# Patient Record
Sex: FEMALE | Race: WHITE | NOT HISPANIC OR LATINO | Employment: OTHER | URBAN - METROPOLITAN AREA
[De-identification: names, ages, dates, MRNs, and addresses within clinical notes are randomized per-mention and may not be internally consistent; named-entity substitution may affect disease eponyms.]

---

## 2022-01-07 ENCOUNTER — LAB REQUISITION (OUTPATIENT)
Dept: LAB | Facility: CLINIC | Age: 87
End: 2022-01-07
Payer: MEDICARE

## 2022-01-07 DIAGNOSIS — U07.1 COVID-19: ICD-10-CM

## 2022-01-08 ENCOUNTER — LAB REQUISITION (OUTPATIENT)
Dept: LAB | Facility: CLINIC | Age: 87
End: 2022-01-08
Payer: COMMERCIAL

## 2022-01-08 DIAGNOSIS — U07.1 COVID-19: ICD-10-CM

## 2022-01-08 PROCEDURE — U0003 INFECTIOUS AGENT DETECTION BY NUCLEIC ACID (DNA OR RNA); SEVERE ACUTE RESPIRATORY SYNDROME CORONAVIRUS 2 (SARS-COV-2) (CORONAVIRUS DISEASE [COVID-19]), AMPLIFIED PROBE TECHNIQUE, MAKING USE OF HIGH THROUGHPUT TECHNOLOGIES AS DESCRIBED BY CMS-2020-01-R: HCPCS | Mod: ORL | Performed by: INTERNAL MEDICINE

## 2022-01-09 LAB — SARS-COV-2 RNA RESP QL NAA+PROBE: NEGATIVE

## 2022-05-04 ENCOUNTER — LAB REQUISITION (OUTPATIENT)
Dept: LAB | Facility: CLINIC | Age: 87
End: 2022-05-04
Payer: COMMERCIAL

## 2022-05-04 DIAGNOSIS — U07.1 COVID-19: ICD-10-CM

## 2022-05-04 PROCEDURE — U0003 INFECTIOUS AGENT DETECTION BY NUCLEIC ACID (DNA OR RNA); SEVERE ACUTE RESPIRATORY SYNDROME CORONAVIRUS 2 (SARS-COV-2) (CORONAVIRUS DISEASE [COVID-19]), AMPLIFIED PROBE TECHNIQUE, MAKING USE OF HIGH THROUGHPUT TECHNOLOGIES AS DESCRIBED BY CMS-2020-01-R: HCPCS | Mod: ORL | Performed by: INTERNAL MEDICINE

## 2022-05-05 LAB — SARS-COV-2 RNA RESP QL NAA+PROBE: NEGATIVE

## 2022-09-04 ENCOUNTER — TRANSFERRED RECORDS (OUTPATIENT)
Dept: HEALTH INFORMATION MANAGEMENT | Facility: CLINIC | Age: 87
End: 2022-09-04

## 2022-09-05 ENCOUNTER — TRANSFERRED RECORDS (OUTPATIENT)
Dept: HEALTH INFORMATION MANAGEMENT | Facility: CLINIC | Age: 87
End: 2022-09-05

## 2022-09-06 ENCOUNTER — TRANSFERRED RECORDS (OUTPATIENT)
Dept: HEALTH INFORMATION MANAGEMENT | Facility: CLINIC | Age: 87
End: 2022-09-06

## 2022-12-06 ENCOUNTER — TRANSFERRED RECORDS (OUTPATIENT)
Dept: HEALTH INFORMATION MANAGEMENT | Facility: CLINIC | Age: 87
End: 2022-12-06

## 2022-12-08 ENCOUNTER — MEDICAL CORRESPONDENCE (OUTPATIENT)
Dept: HEALTH INFORMATION MANAGEMENT | Facility: CLINIC | Age: 87
End: 2022-12-08

## 2022-12-09 ENCOUNTER — MEDICAL CORRESPONDENCE (OUTPATIENT)
Dept: HEALTH INFORMATION MANAGEMENT | Facility: CLINIC | Age: 87
End: 2022-12-09

## 2022-12-12 ENCOUNTER — TELEPHONE (OUTPATIENT)
Dept: GASTROENTEROLOGY | Facility: CLINIC | Age: 87
End: 2022-12-12

## 2022-12-12 ENCOUNTER — TRANSCRIBE ORDERS (OUTPATIENT)
Dept: OTHER | Age: 87
End: 2022-12-12

## 2022-12-12 DIAGNOSIS — K80.50 CHOLEDOCHOLITHIASIS: Primary | ICD-10-CM

## 2022-12-12 NOTE — TELEPHONE ENCOUNTER
Advanced Endoscopy     Referring provider:   Referred by: Bal Harvey MD - [facility]   [address] [facility] [address]  [place] [address]  Ph: [phone] Fx: [phone]      Referred to: Advanced Endoscopy Provider [facility]     Provider Requested:  NA     Referral Received: 12/12/22       Records received: CareEverywhere     Images received: none    Evaluation for: Non-Urgent ERCP in 2-3 months for stone removal. Large impacted stone, needs lithotripsy       Clinical History (per RN review):   Admission at [facility] in September 2022  Choledocholithiasis/ Cholelithiasis w/ dilated CBD  - s/p ERCP 9/6 with biliary stents placed   - initially on zosyn --> changed to augmentin to complete 7 day course. GI will arrange f/u for repeat ERCP & EGD    ERCP 9-6-22  Impression:            - The major papilla appeared to be                          flat.                          - Choledocholithiasis was found.                          Removal by biliary sphincterotomy                          was not accomplished; a stent was                          inserted.                          - A biliary sphincterotomy was                          performed.                          - The biliary tree was swept and                          debris and sludge were found.                          - Two stents were placed into the                          common bile duct.       ERCP 12/6/22  Impression:            - The major papilla was adjacent to                          a diverticulum.                          - Two stents from the biliary tree                          were seen in the major papilla.                          - Choledocholithiasis was found.                          Partial removal was accomplished                          with balloon extraction; a stent                          was inserted.                          - Two stents were removed from the                          biliary  tree.                          - The biliary tree was swept.                          - One biliary stent was placed into                          the common bile duct.     Imaging CT Abdomen 9/4/22    IMPRESSION:   1. The common bile duct appears moderately dilated and is filled with heterogeneous intermediate attenuation material that could represent calculi. Comparison with prior imaging studies would be useful, if available. If not available, a right upper quadrant ultrasound would be useful for further evaluation.   2. A focus of gas in the urinary bladder lumen could relate to recent instrumentation or cystitis. Recommend clinical correlation.   3. No other acute abnormality identified in the abdomen or pelvis.   4. Cholelithiasis.      MD review date: 12/12/22    MD Decision for clinic consultation/Orders:            Referral updates/Patient contacted:

## 2022-12-14 ENCOUNTER — PREP FOR PROCEDURE (OUTPATIENT)
Dept: GASTROENTEROLOGY | Facility: CLINIC | Age: 87
End: 2022-12-14

## 2022-12-14 DIAGNOSIS — K80.50 CHOLEDOCHOLITHIASIS: Primary | ICD-10-CM

## 2022-12-14 NOTE — TELEPHONE ENCOUNTER
Per Dr. Gale  Schedule ercp w me whenever feasible in    Needs preop, needs to hold plavix for 5 days    Please assist in scheduling:     Procedure/Imaging/Clinic: ERCP w/EHL  Physician: Dr. Gale  Timin22  Procedure length:provider average  Anesthesia:gen  Dx: choledocholathiasis  Tier:2  Location: [facility]       Called to discuss with patient.     Explained they can expect a call from  for date and time of procedure, will need a , someone to stay with them for 24 hours and should stay in town for 24 hours (within 45 min of Hospital) post procedure    Patient needs to get pre-op physical completed. If outside [facility] system will need physical faxed to number [phone]   If you do not get a preop physical, your procedure could be cancelled, patient voiced understanding*    Preop Plan:PCP will do     Med Review    Blood thinner -  Plavix, will hold for 5 days  ASA - no  Diabetic - no    COVID test discussed:policy reviewed    Patient Education r/t procedure:done    Does patient have any history of gastric bypass/gastric surgery/altered panc/bili anatomy?no    A pre-op nurse will call 1-2 days prior to the procedure.    NPO/Prep:   Adults and Children of all ages may consume solids up to 8 hours prior to arrival time - may consume clear liquids up to 1 hour prior to arrival time.    Other specific details/comments:none     Verbalized understanding of all instructions. All questions answered.     Procedure order placed, message routed to OR / Endo

## 2023-01-03 ENCOUNTER — TELEPHONE (OUTPATIENT)
Dept: GASTROENTEROLOGY | Facility: CLINIC | Age: 88
End: 2023-01-03

## 2023-01-03 NOTE — TELEPHONE ENCOUNTER
Patient reached out inquiring more information regarding up coming procedure on 1/17 at [facility]. Inquired about check in time, location, pre-op physical requirement. Provided information base on Suzette's telephone encounter on 12/12 and informed patient that staff will generally reach out closer to date to go over details and instructions. Patient expressed understanding.

## 2023-01-10 ENCOUNTER — TRANSFERRED RECORDS (OUTPATIENT)
Dept: MULTI SPECIALTY CLINIC | Facility: CLINIC | Age: 88
End: 2023-01-10

## 2023-01-10 LAB
CREATININE (EXTERNAL): 0.8 MG/DL (ref 0.5–1.2)
GFR ESTIMATED (EXTERNAL): 67 ML/MIN/1.73
GFR ESTIMATED (IF AFRICAN AMERICAN) (EXTERNAL): 78 ML/MIN/1.73
GLUCOSE (EXTERNAL): 189 MG/DL (ref 65–99)
POTASSIUM (EXTERNAL): 4.2 MMOL/L (ref 3.5–5)

## 2023-01-13 RX ORDER — METOPROLOL SUCCINATE 25 MG/1
25 TABLET, EXTENDED RELEASE ORAL
COMMUNITY
Start: 2022-09-11 | End: 2023-09-11

## 2023-01-13 RX ORDER — ATORVASTATIN CALCIUM 40 MG/1
40 TABLET, FILM COATED ORAL DAILY
COMMUNITY
Start: 2022-03-30

## 2023-01-13 RX ORDER — FEXOFENADINE HCL 180 MG/1
180 TABLET ORAL DAILY
COMMUNITY
Start: 2022-02-16

## 2023-01-13 RX ORDER — NITROGLYCERIN 0.4 MG/1
0.4 TABLET SUBLINGUAL
COMMUNITY

## 2023-01-13 RX ORDER — LEVOCETIRIZINE DIHYDROCHLORIDE 5 MG/1
5 TABLET, FILM COATED ORAL
COMMUNITY

## 2023-01-13 RX ORDER — CLOPIDOGREL BISULFATE 75 MG/1
75 TABLET ORAL DAILY
COMMUNITY
Start: 2022-03-31

## 2023-01-13 RX ORDER — INSULIN LISPRO 100 [IU]/ML
7 INJECTION, SOLUTION INTRAVENOUS; SUBCUTANEOUS
COMMUNITY

## 2023-01-13 RX ORDER — INSULIN GLARGINE 100 [IU]/ML
20 INJECTION, SOLUTION SUBCUTANEOUS AT BEDTIME
COMMUNITY

## 2023-01-13 RX ORDER — LEVOTHYROXINE SODIUM 88 UG/1
88 TABLET ORAL DAILY
COMMUNITY

## 2023-01-13 RX ORDER — HYDROCHLOROTHIAZIDE 25 MG/1
12.5 TABLET ORAL DAILY
COMMUNITY
Start: 2022-03-02

## 2023-01-13 RX ORDER — ISOSORBIDE MONONITRATE 30 MG/1
60 TABLET, EXTENDED RELEASE ORAL DAILY
COMMUNITY
Start: 2022-08-04

## 2023-01-16 ENCOUNTER — ANESTHESIA EVENT (OUTPATIENT)
Dept: SURGERY | Facility: CLINIC | Age: 88
End: 2023-01-16
Payer: COMMERCIAL

## 2023-01-16 ASSESSMENT — LIFESTYLE VARIABLES: TOBACCO_USE: 0

## 2023-01-16 NOTE — ANESTHESIA PREPROCEDURE EVALUATION
Anesthesia Pre-Procedure Evaluation    Patient: Marcela Kim   MRN: [id number] : 1935        Procedure : Procedure(s):  ENDOSCOPIC RETROGRADE CHOLANGIOPANCREATOGRAPHY, WITH ELECTROHYDRAULIC LITHOTRIPSY USING OpenSpace DIRECT VISUALIZATION SYSTEM          Past Medical History:   Diagnosis Date     Antiplatelet or antithrombotic long-term use      ASCVD (arteriosclerotic cardiovascular disease)      Hypertension      Stented coronary artery      Thyroid disease       Past Surgical History:   Procedure Laterality Date     AS TOTAL ABDOM HYSTERECTOMY       BACK SURGERY       COLECTOMY PARTIAL       EYE SURGERY       Stent      ASCVD Stent of LAD and PTCA       Allergies   Allergen Reactions     Lisinopril Hives and Rash     Rash       Hydralazine Dizziness     Head fullness, feeling unwell.  Avoid dose of 25mg twice daily.        Amlodipine      Other reaction(s): edema     Gabapentin      Other reaction(s): dizziness     Omeprazole Hives     Pt taking. States not allergic.        Social History     Tobacco Use     Smoking status: Never     Smokeless tobacco: Never   Substance Use Topics     Alcohol use: Not Currently     Comment: rare      Wt Readings from Last 1 Encounters:   No data found for Wt        Anesthesia Evaluation   Pt has had prior anesthetic. Type: General.        ROS/MED HX  ENT/Pulmonary:    (-) tobacco use   Neurologic:       Cardiovascular:     (+) Dyslipidemia hypertension--CAD (s/p stent to LAD) ---    METS/Exercise Tolerance:     Hematologic:       Musculoskeletal:       GI/Hepatic: Comment: Choledocholithiasis s/p ERCP and stent at OSH    (+) GERD (s/p prior EGD for gastric ulcer),     Renal/Genitourinary:       Endo:     (+) type II DM, Using insulin, thyroid problem, hypothyroidism,     Psychiatric/Substance Use:       Infectious Disease:       Malignancy:       Other:            Physical Exam    Airway        Mallampati: II   TM distance: > 3 FB   Neck ROM: full   Mouth  opening: > 3 cm    Respiratory Devices and Support         Dental         B=Bridge, C=Chipped, L=Loose, M=Missing    Cardiovascular   cardiovascular exam normal          Pulmonary                   OUTSIDE LABS:  CBC: No results found for: WBC, HGB, HCT, PLT  BMP: No results found for: NA, POTASSIUM, CHLORIDE, CO2, BUN, CR, GLC  COAGS: No results found for: PTT, INR, FIBR  POC: No results found for: BGM, HCG, HCGS  HEPATIC: No results found for: ALBUMIN, PROTTOTAL, ALT, AST, GGT, ALKPHOS, BILITOTAL, BILIDIRECT, SONDRA  OTHER: No results found for: PH, LACT, A1C, LIZETT, PHOS, MAG, LIPASE, AMYLASE, TSH, T4, T3, CRP, SED    Anesthesia Plan    ASA Status:  3   NPO Status:  NPO Appropriate    Anesthesia Type: General.     - Airway: ETT   Induction: Intravenous.   Maintenance: Inhalation.        Consents    Anesthesia Plan(s) and associated risks, benefits, and realistic alternatives discussed. Questions answered and patient/representative(s) expressed understanding.     - Discussed: Risks, Benefits and Alternatives for BOTH SEDATION and the PROCEDURE were discussed     - Discussed with:  Patient         Postoperative Care    Pain management: IV analgesics, Multi-modal analgesia.   PONV prophylaxis: Ondansetron (or other 5HT-3), Dexamethasone or Solumedrol     Comments:                Andi Escamilla MD

## 2023-01-17 ENCOUNTER — ANESTHESIA (OUTPATIENT)
Dept: SURGERY | Facility: CLINIC | Age: 88
End: 2023-01-17
Payer: COMMERCIAL

## 2023-01-17 ENCOUNTER — APPOINTMENT (OUTPATIENT)
Dept: GENERAL RADIOLOGY | Facility: CLINIC | Age: 88
End: 2023-01-17
Attending: INTERNAL MEDICINE
Payer: COMMERCIAL

## 2023-01-17 ENCOUNTER — HOSPITAL ENCOUNTER (OUTPATIENT)
Facility: CLINIC | Age: 88
Discharge: HOME | End: 2023-01-17
Attending: INTERNAL MEDICINE | Admitting: INTERNAL MEDICINE
Payer: COMMERCIAL

## 2023-01-17 VITALS
TEMPERATURE: 97.5 F | HEIGHT: 61 IN | OXYGEN SATURATION: 95 % | SYSTOLIC BLOOD PRESSURE: 140 MMHG | WEIGHT: 128.75 LBS | BODY MASS INDEX: 24.31 KG/M2 | RESPIRATION RATE: 18 BRPM | HEART RATE: 65 BPM | DIASTOLIC BLOOD PRESSURE: 62 MMHG

## 2023-01-17 LAB
ALBUMIN SERPL BCG-MCNC: 3.6 G/DL (ref 3.5–5.2)
ALP SERPL-CCNC: 131 U/L (ref 35–104)
ALT SERPL W P-5'-P-CCNC: 8 U/L (ref 10–35)
AMYLASE SERPL-CCNC: 41 U/L (ref 28–100)
ANION GAP SERPL CALCULATED.3IONS-SCNC: 13 MMOL/L (ref 7–15)
AST SERPL W P-5'-P-CCNC: 17 U/L (ref 10–35)
BILIRUB SERPL-MCNC: 0.7 MG/DL
BUN SERPL-MCNC: 19.7 MG/DL (ref 8–23)
CALCIUM SERPL-MCNC: 8.8 MG/DL (ref 8.8–10.2)
CHLORIDE SERPL-SCNC: 102 MMOL/L (ref 98–107)
CREAT SERPL-MCNC: 0.86 MG/DL (ref 0.51–0.95)
DEPRECATED HCO3 PLAS-SCNC: 20 MMOL/L (ref 22–29)
ERCP: NORMAL
ERYTHROCYTE [DISTWIDTH] IN BLOOD BY AUTOMATED COUNT: 12.7 % (ref 10–15)
GFR SERPL CREATININE-BSD FRML MDRD: 66 ML/MIN/1.73M2
GLUCOSE BLDC GLUCOMTR-MCNC: 207 MG/DL (ref 70–99)
GLUCOSE BLDC GLUCOMTR-MCNC: 272 MG/DL (ref 70–99)
GLUCOSE BLDC GLUCOMTR-MCNC: 338 MG/DL (ref 70–99)
GLUCOSE BLDC GLUCOMTR-MCNC: 345 MG/DL (ref 70–99)
GLUCOSE BLDC GLUCOMTR-MCNC: 403 MG/DL (ref 70–99)
GLUCOSE SERPL-MCNC: 449 MG/DL (ref 70–99)
HCT VFR BLD AUTO: 37.7 % (ref 35–47)
HGB BLD-MCNC: 12.1 G/DL (ref 11.7–15.7)
INR PPP: 1.13 (ref 0.85–1.15)
LIPASE SERPL-CCNC: 15 U/L (ref 13–60)
MCH RBC QN AUTO: 28.7 PG (ref 26.5–33)
MCHC RBC AUTO-ENTMCNC: 32.1 G/DL (ref 31.5–36.5)
MCV RBC AUTO: 90 FL (ref 78–100)
PLATELET # BLD AUTO: 193 10E3/UL (ref 150–450)
POTASSIUM SERPL-SCNC: 4 MMOL/L (ref 3.4–5.3)
PROT SERPL-MCNC: 6 G/DL (ref 6.4–8.3)
RBC # BLD AUTO: 4.21 10E6/UL (ref 3.8–5.2)
SODIUM SERPL-SCNC: 135 MMOL/L (ref 136–145)
WBC # BLD AUTO: 6.3 10E3/UL (ref 4–11)

## 2023-01-17 PROCEDURE — 80053 COMPREHEN METABOLIC PANEL: CPT | Performed by: INTERNAL MEDICINE

## 2023-01-17 PROCEDURE — 36415 COLL VENOUS BLD VENIPUNCTURE: CPT | Performed by: INTERNAL MEDICINE

## 2023-01-17 PROCEDURE — 250N000013 HC RX MED GY IP 250 OP 250 PS 637: Performed by: STUDENT IN AN ORGANIZED HEALTH CARE EDUCATION/TRAINING PROGRAM

## 2023-01-17 PROCEDURE — 250N000009 HC RX 250: Performed by: ANESTHESIOLOGY

## 2023-01-17 PROCEDURE — C1876 STENT, NON-COA/NON-COV W/DEL: HCPCS | Performed by: INTERNAL MEDICINE

## 2023-01-17 PROCEDURE — 250N000009 HC RX 250: Performed by: STUDENT IN AN ORGANIZED HEALTH CARE EDUCATION/TRAINING PROGRAM

## 2023-01-17 PROCEDURE — 83690 ASSAY OF LIPASE: CPT | Performed by: INTERNAL MEDICINE

## 2023-01-17 PROCEDURE — 360N000082 HC SURGERY LEVEL 2 W/ FLUORO, PER MIN: Performed by: INTERNAL MEDICINE

## 2023-01-17 PROCEDURE — 82150 ASSAY OF AMYLASE: CPT | Performed by: INTERNAL MEDICINE

## 2023-01-17 PROCEDURE — 370N000017 HC ANESTHESIA TECHNICAL FEE, PER MIN: Performed by: INTERNAL MEDICINE

## 2023-01-17 PROCEDURE — 250N000011 HC RX IP 250 OP 636: Performed by: ANESTHESIOLOGY

## 2023-01-17 PROCEDURE — 272N000001 HC OR GENERAL SUPPLY STERILE: Performed by: INTERNAL MEDICINE

## 2023-01-17 PROCEDURE — 85610 PROTHROMBIN TIME: CPT | Performed by: INTERNAL MEDICINE

## 2023-01-17 PROCEDURE — 999N000141 HC STATISTIC PRE-PROCEDURE NURSING ASSESSMENT: Performed by: INTERNAL MEDICINE

## 2023-01-17 PROCEDURE — 88305 TISSUE EXAM BY PATHOLOGIST: CPT | Mod: TC | Performed by: INTERNAL MEDICINE

## 2023-01-17 PROCEDURE — 258N000003 HC RX IP 258 OP 636: Performed by: STUDENT IN AN ORGANIZED HEALTH CARE EDUCATION/TRAINING PROGRAM

## 2023-01-17 PROCEDURE — C1769 GUIDE WIRE: HCPCS | Performed by: INTERNAL MEDICINE

## 2023-01-17 PROCEDURE — 710N000012 HC RECOVERY PHASE 2, PER MINUTE: Performed by: INTERNAL MEDICINE

## 2023-01-17 PROCEDURE — 82962 GLUCOSE BLOOD TEST: CPT | Mod: 91

## 2023-01-17 PROCEDURE — 250N000025 HC SEVOFLURANE, PER MIN: Performed by: INTERNAL MEDICINE

## 2023-01-17 PROCEDURE — 710N000010 HC RECOVERY PHASE 1, LEVEL 2, PER MIN: Performed by: INTERNAL MEDICINE

## 2023-01-17 PROCEDURE — 255N000002 HC RX 255 OP 636: Performed by: INTERNAL MEDICINE

## 2023-01-17 PROCEDURE — 999N000181 XR SURGERY CARM FLUORO GREATER THAN 5 MIN W STILLS: Mod: TC

## 2023-01-17 PROCEDURE — 250N000009 HC RX 250: Performed by: INTERNAL MEDICINE

## 2023-01-17 PROCEDURE — 85027 COMPLETE CBC AUTOMATED: CPT | Performed by: INTERNAL MEDICINE

## 2023-01-17 PROCEDURE — 250N000011 HC RX IP 250 OP 636: Performed by: STUDENT IN AN ORGANIZED HEALTH CARE EDUCATION/TRAINING PROGRAM

## 2023-01-17 DEVICE — STENT SOLUS BILIARY 10FRX07CM DBL PIGTAIL W/INTRO G25673
Type: IMPLANTABLE DEVICE | Site: BILE DUCT | Status: NON-FUNCTIONAL
Removed: 2023-02-14

## 2023-01-17 RX ORDER — ONDANSETRON 2 MG/ML
4 INJECTION INTRAMUSCULAR; INTRAVENOUS EVERY 30 MIN PRN
Status: DISCONTINUED | OUTPATIENT
Start: 2023-01-17 | End: 2023-01-17 | Stop reason: HOSPADM

## 2023-01-17 RX ORDER — LEVOFLOXACIN 5 MG/ML
INJECTION, SOLUTION INTRAVENOUS PRN
Status: DISCONTINUED | OUTPATIENT
Start: 2023-01-17 | End: 2023-01-17

## 2023-01-17 RX ORDER — ONDANSETRON 2 MG/ML
4 INJECTION INTRAMUSCULAR; INTRAVENOUS EVERY 6 HOURS PRN
Status: DISCONTINUED | OUTPATIENT
Start: 2023-01-17 | End: 2023-01-17 | Stop reason: HOSPADM

## 2023-01-17 RX ORDER — NALOXONE HYDROCHLORIDE 0.4 MG/ML
0.4 INJECTION, SOLUTION INTRAMUSCULAR; INTRAVENOUS; SUBCUTANEOUS
Status: DISCONTINUED | OUTPATIENT
Start: 2023-01-17 | End: 2023-01-17 | Stop reason: HOSPADM

## 2023-01-17 RX ORDER — ONDANSETRON 4 MG/1
4 TABLET, ORALLY DISINTEGRATING ORAL EVERY 30 MIN PRN
Status: DISCONTINUED | OUTPATIENT
Start: 2023-01-17 | End: 2023-01-17 | Stop reason: HOSPADM

## 2023-01-17 RX ORDER — NALOXONE HYDROCHLORIDE 0.4 MG/ML
0.2 INJECTION, SOLUTION INTRAMUSCULAR; INTRAVENOUS; SUBCUTANEOUS
Status: DISCONTINUED | OUTPATIENT
Start: 2023-01-17 | End: 2023-01-17 | Stop reason: HOSPADM

## 2023-01-17 RX ORDER — PROPOFOL 10 MG/ML
INJECTION, EMULSION INTRAVENOUS PRN
Status: DISCONTINUED | OUTPATIENT
Start: 2023-01-17 | End: 2023-01-17

## 2023-01-17 RX ORDER — FLUMAZENIL 0.1 MG/ML
0.2 INJECTION, SOLUTION INTRAVENOUS
Status: DISCONTINUED | OUTPATIENT
Start: 2023-01-17 | End: 2023-01-17 | Stop reason: HOSPADM

## 2023-01-17 RX ORDER — FENTANYL CITRATE 50 UG/ML
50 INJECTION, SOLUTION INTRAMUSCULAR; INTRAVENOUS EVERY 5 MIN PRN
Status: DISCONTINUED | OUTPATIENT
Start: 2023-01-17 | End: 2023-01-17 | Stop reason: HOSPADM

## 2023-01-17 RX ORDER — SODIUM CHLORIDE, SODIUM LACTATE, POTASSIUM CHLORIDE, CALCIUM CHLORIDE 600; 310; 30; 20 MG/100ML; MG/100ML; MG/100ML; MG/100ML
INJECTION, SOLUTION INTRAVENOUS CONTINUOUS
Status: DISCONTINUED | OUTPATIENT
Start: 2023-01-17 | End: 2023-01-17 | Stop reason: HOSPADM

## 2023-01-17 RX ORDER — FENTANYL CITRATE 50 UG/ML
INJECTION, SOLUTION INTRAMUSCULAR; INTRAVENOUS PRN
Status: DISCONTINUED | OUTPATIENT
Start: 2023-01-17 | End: 2023-01-17

## 2023-01-17 RX ORDER — ONDANSETRON 2 MG/ML
INJECTION INTRAMUSCULAR; INTRAVENOUS PRN
Status: DISCONTINUED | OUTPATIENT
Start: 2023-01-17 | End: 2023-01-17

## 2023-01-17 RX ORDER — HYDROMORPHONE HYDROCHLORIDE 1 MG/ML
0.4 INJECTION, SOLUTION INTRAMUSCULAR; INTRAVENOUS; SUBCUTANEOUS EVERY 5 MIN PRN
Status: DISCONTINUED | OUTPATIENT
Start: 2023-01-17 | End: 2023-01-17 | Stop reason: HOSPADM

## 2023-01-17 RX ORDER — INDOMETHACIN 50 MG/1
SUPPOSITORY RECTAL PRN
Status: DISCONTINUED | OUTPATIENT
Start: 2023-01-17 | End: 2023-01-17 | Stop reason: HOSPADM

## 2023-01-17 RX ORDER — LIDOCAINE HYDROCHLORIDE 20 MG/ML
INJECTION, SOLUTION INFILTRATION; PERINEURAL PRN
Status: DISCONTINUED | OUTPATIENT
Start: 2023-01-17 | End: 2023-01-17

## 2023-01-17 RX ORDER — MEPERIDINE HYDROCHLORIDE 25 MG/ML
12.5 INJECTION INTRAMUSCULAR; INTRAVENOUS; SUBCUTANEOUS
Status: DISCONTINUED | OUTPATIENT
Start: 2023-01-17 | End: 2023-01-17 | Stop reason: HOSPADM

## 2023-01-17 RX ORDER — FENTANYL CITRATE 50 UG/ML
25 INJECTION, SOLUTION INTRAMUSCULAR; INTRAVENOUS EVERY 5 MIN PRN
Status: DISCONTINUED | OUTPATIENT
Start: 2023-01-17 | End: 2023-01-17 | Stop reason: HOSPADM

## 2023-01-17 RX ORDER — LIDOCAINE 40 MG/G
CREAM TOPICAL
Status: DISCONTINUED | OUTPATIENT
Start: 2023-01-17 | End: 2023-01-17 | Stop reason: HOSPADM

## 2023-01-17 RX ORDER — FENTANYL CITRATE 50 UG/ML
25 INJECTION, SOLUTION INTRAMUSCULAR; INTRAVENOUS
Status: DISCONTINUED | OUTPATIENT
Start: 2023-01-17 | End: 2023-01-17 | Stop reason: HOSPADM

## 2023-01-17 RX ORDER — ONDANSETRON 4 MG/1
4 TABLET, ORALLY DISINTEGRATING ORAL EVERY 6 HOURS PRN
Status: DISCONTINUED | OUTPATIENT
Start: 2023-01-17 | End: 2023-01-17 | Stop reason: HOSPADM

## 2023-01-17 RX ORDER — IOPAMIDOL 510 MG/ML
INJECTION, SOLUTION INTRAVASCULAR PRN
Status: DISCONTINUED | OUTPATIENT
Start: 2023-01-17 | End: 2023-01-17 | Stop reason: HOSPADM

## 2023-01-17 RX ORDER — HYDROMORPHONE HYDROCHLORIDE 1 MG/ML
0.2 INJECTION, SOLUTION INTRAMUSCULAR; INTRAVENOUS; SUBCUTANEOUS EVERY 5 MIN PRN
Status: DISCONTINUED | OUTPATIENT
Start: 2023-01-17 | End: 2023-01-17 | Stop reason: HOSPADM

## 2023-01-17 RX ORDER — SODIUM CHLORIDE, SODIUM GLUCONATE, SODIUM ACETATE, POTASSIUM CHLORIDE AND MAGNESIUM CHLORIDE 526; 502; 368; 37; 30 MG/100ML; MG/100ML; MG/100ML; MG/100ML; MG/100ML
INJECTION, SOLUTION INTRAVENOUS CONTINUOUS PRN
Status: DISCONTINUED | OUTPATIENT
Start: 2023-01-17 | End: 2023-01-17

## 2023-01-17 RX ADMIN — NOREPINEPHRINE BITARTRATE 12.8 MCG: 1 INJECTION, SOLUTION, CONCENTRATE INTRAVENOUS at 09:05

## 2023-01-17 RX ADMIN — LIDOCAINE HYDROCHLORIDE 60 MG: 20 INJECTION, SOLUTION INFILTRATION; PERINEURAL at 08:41

## 2023-01-17 RX ADMIN — Medication 40 MG: at 08:42

## 2023-01-17 RX ADMIN — SUGAMMADEX 200 MG: 100 INJECTION, SOLUTION INTRAVENOUS at 10:24

## 2023-01-17 RX ADMIN — PROPOFOL 20 MG: 10 INJECTION, EMULSION INTRAVENOUS at 10:12

## 2023-01-17 RX ADMIN — ONDANSETRON 4 MG: 2 INJECTION INTRAMUSCULAR; INTRAVENOUS at 09:54

## 2023-01-17 RX ADMIN — PROPOFOL 80 MG: 10 INJECTION, EMULSION INTRAVENOUS at 08:41

## 2023-01-17 RX ADMIN — PROPOFOL 30 MG: 10 INJECTION, EMULSION INTRAVENOUS at 09:53

## 2023-01-17 RX ADMIN — HUMAN INSULIN 4 UNITS: 100 INJECTION, SOLUTION SUBCUTANEOUS at 11:34

## 2023-01-17 RX ADMIN — Medication 10 MG: at 09:48

## 2023-01-17 RX ADMIN — FENTANYL CITRATE 100 MCG: 50 INJECTION, SOLUTION INTRAMUSCULAR; INTRAVENOUS at 08:40

## 2023-01-17 RX ADMIN — PHENYLEPHRINE HYDROCHLORIDE 200 MCG: 10 INJECTION INTRAVENOUS at 08:54

## 2023-01-17 RX ADMIN — HUMAN INSULIN 5 UNITS: 100 INJECTION, SOLUTION SUBCUTANEOUS at 10:52

## 2023-01-17 RX ADMIN — PROPOFOL 20 MG: 10 INJECTION, EMULSION INTRAVENOUS at 09:13

## 2023-01-17 RX ADMIN — SODIUM CHLORIDE, SODIUM GLUCONATE, SODIUM ACETATE, POTASSIUM CHLORIDE AND MAGNESIUM CHLORIDE: 526; 502; 368; 37; 30 INJECTION, SOLUTION INTRAVENOUS at 08:35

## 2023-01-17 RX ADMIN — LEVOFLOXACIN 500 MG: 5 INJECTION, SOLUTION INTRAVENOUS at 08:45

## 2023-01-17 ASSESSMENT — ACTIVITIES OF DAILY LIVING (ADL)
ADLS_ACUITY_SCORE: 35

## 2023-01-17 NOTE — BRIEF OP NOTE
[facility]    Brief Operative Note  Marcela Kim is an 87 year old female with history of choledocholithiasis s/p partial extraction of biliary stones and biliary stent placement who presents for an ERCP wit possible EHL. She had an ERCP on 9/6/22 in which she was found to have multiple stones which some of them were removed. There was one large stone impacted in the hilum that could not be removed. She had a 10 Fr stent placed.    Pre-operative diagnosis: Choledocholithiasis [K80.50]  Post-operative diagnosis Same as pre-operative diagnosis    Procedure: Procedure(s):  ENDOSCOPIC RETROGRADE CHOLANGIOPANCREATOGRAPHY,SPYGLASS DIRECT VISUALIZATION SYSTEM cholangioscopy with spybite biopsies, bile duct stent placed, balloon sweep of bile duct for sludge  Surgeon: Surgeon(s) and Role:     * Chaz Gale MD - Primary     * Renate Joshua MD  Anesthesia: General   Estimated Blood Loss: None    Drains: None  Specimens:   ID Type Source Tests Collected by Time Destination   1 : Right hepatic duct mass biopsies Biopsy Other SURGICAL PATHOLOGY EXAM Chaz Gale MD 1/17/2023  9:39 AM    2 : Hilar bile duct mass biopsies Biopsy Other SURGICAL PATHOLOGY EXAM Vanderheyden, Jennifer L, RN 1/17/2023  9:43 AM      Findings:     - One partially occluded stent from the biliary tree was seen in the major papilla and removed with a snare   - Prior biliary sphincterotomy appeared open.  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 The mass involves the bifurcation and extends into the right common hepatic duct (Bismuth III). The left common hepatic duct appears normal   - SpyBite miniature biopsy forceps for histology  - A 10 Fr x 7 cm DPT biliary stent was placed into the into the common hepatic hepatic across  the mass     Complications: None.  Implants:   Implant Name Type Inv. Item Serial No.  Lot No. LRB No. Used Action   STENT SOLUS BILIARY [id number] DBL PIGTAIL W/INTRO [id number] - [id number] Stent STENT SOLUS BILIARY [id number] DBL PIGTAIL W/INTRO [id number]  [facility] [id number] N/A 1 Implanted       Recommendations  - Observe patient in same day observation unit for ongoing care with plans for discharge to home later today   - Await pathology results   - The findings and recommendations were discussed with the patient and their family

## 2023-01-17 NOTE — ANESTHESIA PROCEDURE NOTES
Airway       Patient location during procedure: OR       Procedure Start/Stop Times: 1/17/2023 8:44 AM  Staff -        CRNA: Jose Dash APRN CRNA       Performed By: CRNA  Consent for Airway        Urgency: elective  Indications and Patient Condition       Indications for airway management: megan-procedural       Induction type:intravenous       Mask difficulty assessment: 1 - vent by mask    Final Airway Details       Final airway type: endotracheal airway       Successful airway: ETT - single  Endotracheal Airway Details        ETT size (mm): 7.0       Cuffed: yes       Successful intubation technique: direct laryngoscopy       DL Blade Type: MAC 3       Grade View of Cords: 2       Adjucts: stylet       Position: Right       Measured from: gums/teeth       Secured at (cm): 23       Bite block used: None    Post intubation assessment        Placement verified by: capnometry, equal breath sounds and chest rise        Number of attempts at approach: 1       Number of other approaches attempted: 0       Secured with: pink tape       Ease of procedure: easy       Dentition: Intact and Unchanged    Medication(s) Administered   Medication Administration Time: 1/17/2023 8:44 AM

## 2023-01-17 NOTE — ANESTHESIA CARE TRANSFER NOTE
Patient: Marcela Kim    Procedure: Procedure(s):  ENDOSCOPIC RETROGRADE CHOLANGIOPANCREATOGRAPHY,SPYGLASS DIRECT VISUALIZATION SYSTEM cholangioscopy with spybite biopsies, bile duct stent placed, balloon sweep of bile duct for sludge       Diagnosis: Choledocholithiasis [K80.50]  Diagnosis Additional Information: No value filed.    Anesthesia Type:   General     Note:    Oropharynx: oropharynx clear of all foreign objects  Level of Consciousness: drowsy and awake  Oxygen Supplementation: nasal cannula  Level of Supplemental Oxygen (L/min / FiO2): 3  Independent Airway: airway patency satisfactory and stable  Dentition: dentition unchanged  Vital Signs Stable: post-procedure vital signs reviewed and stable  Report to RN Given: handoff report given  Patient transferred to: PACU    Handoff Report: Identifed the Patient, Identified the Reponsible Provider, Reviewed the pertinent medical history, Discussed the surgical course, Reviewed Intra-OP anesthesia mangement and issues during anesthesia, Set expectations for post-procedure period and Allowed opportunity for questions and acknowledgement of understanding      Vitals:  Vitals Value Taken Time   BP     Temp     Pulse 57 01/17/23 1040   Resp 16 01/17/23 1040   SpO2 98 % 01/17/23 1040   Vitals shown include unvalidated device data.    Electronically Signed By: CAROLYN Benitez CRNA  January 17, 2023  10:41 AM

## 2023-01-17 NOTE — DISCHARGE INSTRUCTIONS
[facility], [place]  Same-Day Surgery ERCP Procedure   Adult Discharge Orders & Instructions     You had a procedure known as an Endoscopic Retrograde Cholangiopancreatography (ERCP). Your healthcare provider performed the ERCP to look at your bile or pancreatic ducts, and to locate and/or treat blockages (dilation, stenting, removal, etc.) in these ducts. Often biopsies, small samples of tissue, are taken to help diagnose and/or classify stages of disease growth. This procedure is used to diagnose diseases of the pancreas, bile ducts, pancreatic duct, liver, and gallbladder.     After your procedure   Make sure to clarify with your healthcare provider any diet restrictions (For example, clear liquid, low fat, no caffeine, etc.)   Do NOT take aspirin containing medications or any other blood-thinning medicines (anticoagulants) until your healthcare provider says it's OK.   You MAY be prescribed antibiotics, depending on what was done and/or found during your EUS, make sure to take antibiotics as prescribed by your healthcare provider    For 24 hours after surgery  Get plenty of rest.  A responsible adult must stay with you for at least 24 hours after you leave the hospital.   Do not drive or use heavy equipment.  If you have weakness or tingling, don't drive or use heavy equipment until this feeling goes away.  Do not drink alcohol.  Avoid strenuous or risky activities (gym, yoga, cycling, etc.).  Ask for help when climbing stairs.   You may feel lightheaded.  IF so, sit for a few minutes before standing.  Have someone help you get up.   If you have nausea (feel sick to your stomach): Drink only clear liquids such as apple juice, ginger ale, broth or 7-Up.  Rest may also help.  Be sure to drink enough fluids.  Move to a regular diet as you feel able.  If you feel bloated or have too much gas, use a heating pad on your belly to help reduce the discomfort. This should help you feel better.    You may have a slight fever. This is normal for the first 24 hours.   You may have a dry mouth, a sore throat, muscle aches or trouble sleeping.  These are normal and will go away after 24 hours. A sore throat is most common. Use lozenges or gargle with salt water to ease the discomfort.   Do not make important or legal decisions.      Call your doctor for any of the following:  Chest pain, and/or shortness of breath  Abdominal  pain, bloating or cramping that has not improved or does not respond to pain reliving medications (Tylenol or narcotics if prescribed)   Difficulty swallowing or feeling as though food or liquids are stuck in your throat   Sore throat lasting more than 2 days or pain that has worsened over time   Black or tarry stools   Nausea and/or vomiting that is not resolving or has not responded to anti-nausea medications prescribed to you   It has been over 8 to 10 hours since surgery and you are still not able to urinate (pass water)   Headache for over 24 hours   Fever over 100.5 F (38 C) lasting more than 24 hours after the procedure   Signs of jaundice or blockage (fever, chills, abdominal pain, yellowing of the whites of your eyes, yellowing of your skin, and/or passing darker than normal urine)       To contact a doctor, call:  Dr Gale's clinic at [phone] and ask for the resident on call for Gastroenterology (answered 24 hours a day)  Emergency Department: [facility]: [phone] (TTY for hearing impaired: [phone])       Take it easy when you get home.  Remember, same day surgery DOES NOT MEAN SAME DAY RECOVERY!  Healing is a gradual process.  You will need some time to recover - you may be more tired than you realize at first.  Rest and relax for at least the first 24 hours at home.  You'll feel better and heal faster if you take good care of yourself. dixon

## 2023-01-17 NOTE — ANESTHESIA POSTPROCEDURE EVALUATION
Patient: Marcela Kim    Procedure: Procedure(s):  ENDOSCOPIC RETROGRADE CHOLANGIOPANCREATOGRAPHY,SPYGLASS DIRECT VISUALIZATION SYSTEM cholangioscopy with spybite biopsies, bile duct stent placed, balloon sweep of bile duct for sludge       Anesthesia Type:  General    Note:  Disposition: Outpatient   Postop Pain Control: Uneventful            Sign Out: Well controlled pain   PONV: No   Neuro/Psych: Uneventful            Sign Out: Acceptable/Baseline neuro status   Airway/Respiratory: Uneventful            Sign Out: Acceptable/Baseline resp. status   CV/Hemodynamics: Uneventful            Sign Out: Acceptable CV status; No obvious hypovolemia; No obvious fluid overload   Other NRE: NONE   DID A NON-ROUTINE EVENT OCCUR? No         PACU fingerstick glucose 272 (similar to baseline range)    Last vitals:  Vitals Value Taken Time   /62 01/17/23 1200   Temp     Pulse 60 01/17/23 1212   Resp 22 01/17/23 1212   SpO2 94 % 01/17/23 1212   Vitals shown include unvalidated device data.    Electronically Signed By: Andi Escamilla MD  January 17, 2023  12:13 PM

## 2023-01-18 PROCEDURE — 88305 TISSUE EXAM BY PATHOLOGIST: CPT | Mod: 26 | Performed by: PATHOLOGY

## 2023-01-18 PROCEDURE — 88342 IMHCHEM/IMCYTCHM 1ST ANTB: CPT | Mod: 26 | Performed by: PATHOLOGY

## 2023-01-19 LAB
PATH REPORT.ADDENDUM SPEC: NORMAL
PATH REPORT.COMMENTS IMP SPEC: NORMAL
PATH REPORT.FINAL DX SPEC: NORMAL
PATH REPORT.GROSS SPEC: NORMAL
PATH REPORT.MICROSCOPIC SPEC OTHER STN: NORMAL
PATH REPORT.RELEVANT HX SPEC: NORMAL
PHOTO IMAGE: NORMAL

## 2023-01-23 ENCOUNTER — PATIENT OUTREACH (OUTPATIENT)
Dept: GASTROENTEROLOGY | Facility: CLINIC | Age: 88
End: 2023-01-23
Payer: COMMERCIAL

## 2023-01-23 ENCOUNTER — PREP FOR PROCEDURE (OUTPATIENT)
Dept: GASTROENTEROLOGY | Facility: CLINIC | Age: 88
End: 2023-01-23
Payer: COMMERCIAL

## 2023-01-23 DIAGNOSIS — R93.3 ABNORMAL FINDING ON GI TRACT IMAGING: Primary | ICD-10-CM

## 2023-01-23 NOTE — TELEPHONE ENCOUNTER
Per Dr. Gale  I called or and told her benign biopsies   Decreased but does not eliminate chance of cancer   Repeat ercp jimmy w me in next few weeks     Called to discuss repeat procedure with     needs to hold plavix for 5 days     Please assist in scheduling:     Procedure/Imaging/Clinic: ERCP w/jimmy  Physician: Dr. Gale  Timin23  Procedure length:provider average  Anesthesia:gen  Dx: abnormal finding on gi imaging  Tier:2  Location: [facility]    Reviewed above plan with patient, she will hold plavix for 5 days. She understands plan.    Suzette Duron, RN Care Coordinator

## 2023-02-14 ENCOUNTER — ANESTHESIA EVENT (OUTPATIENT)
Dept: SURGERY | Facility: CLINIC | Age: 88
End: 2023-02-14
Payer: COMMERCIAL

## 2023-02-14 ENCOUNTER — APPOINTMENT (OUTPATIENT)
Dept: GENERAL RADIOLOGY | Facility: CLINIC | Age: 88
End: 2023-02-14
Attending: INTERNAL MEDICINE
Payer: COMMERCIAL

## 2023-02-14 ENCOUNTER — ANESTHESIA (OUTPATIENT)
Dept: SURGERY | Facility: CLINIC | Age: 88
End: 2023-02-14
Payer: COMMERCIAL

## 2023-02-14 ENCOUNTER — HOSPITAL ENCOUNTER (OUTPATIENT)
Facility: CLINIC | Age: 88
Discharge: HOME | End: 2023-02-14
Attending: INTERNAL MEDICINE | Admitting: INTERNAL MEDICINE
Payer: COMMERCIAL

## 2023-02-14 VITALS
TEMPERATURE: 98.9 F | OXYGEN SATURATION: 97 % | HEART RATE: 60 BPM | RESPIRATION RATE: 18 BRPM | DIASTOLIC BLOOD PRESSURE: 59 MMHG | WEIGHT: 123.68 LBS | SYSTOLIC BLOOD PRESSURE: 137 MMHG | HEIGHT: 61 IN | BODY MASS INDEX: 23.35 KG/M2

## 2023-02-14 LAB
ALBUMIN SERPL BCG-MCNC: 3.7 G/DL (ref 3.5–5.2)
ALP SERPL-CCNC: 136 U/L (ref 35–104)
ALT SERPL W P-5'-P-CCNC: 9 U/L (ref 10–35)
AMYLASE SERPL-CCNC: 30 U/L (ref 28–100)
ANION GAP SERPL CALCULATED.3IONS-SCNC: 10 MMOL/L (ref 7–15)
AST SERPL W P-5'-P-CCNC: 20 U/L (ref 10–35)
BILIRUB SERPL-MCNC: 1.1 MG/DL
BUN SERPL-MCNC: 22.2 MG/DL (ref 8–23)
CALCIUM SERPL-MCNC: 9.3 MG/DL (ref 8.8–10.2)
CHLORIDE SERPL-SCNC: 101 MMOL/L (ref 98–107)
CREAT SERPL-MCNC: 0.92 MG/DL (ref 0.51–0.95)
DEPRECATED HCO3 PLAS-SCNC: 24 MMOL/L (ref 22–29)
ERCP: NORMAL
ERYTHROCYTE [DISTWIDTH] IN BLOOD BY AUTOMATED COUNT: 13.3 % (ref 10–15)
GFR SERPL CREATININE-BSD FRML MDRD: 61 ML/MIN/1.73M2
GLUCOSE BLDC GLUCOMTR-MCNC: 268 MG/DL (ref 70–99)
GLUCOSE BLDC GLUCOMTR-MCNC: 291 MG/DL (ref 70–99)
GLUCOSE SERPL-MCNC: 289 MG/DL (ref 70–99)
HCT VFR BLD AUTO: 38.5 % (ref 35–47)
HGB BLD-MCNC: 12.4 G/DL (ref 11.7–15.7)
INR PPP: 1.09 (ref 0.85–1.15)
LIPASE SERPL-CCNC: 10 U/L (ref 13–60)
MCH RBC QN AUTO: 29.2 PG (ref 26.5–33)
MCHC RBC AUTO-ENTMCNC: 32.2 G/DL (ref 31.5–36.5)
MCV RBC AUTO: 91 FL (ref 78–100)
PLATELET # BLD AUTO: 172 10E3/UL (ref 150–450)
POTASSIUM SERPL-SCNC: 4.1 MMOL/L (ref 3.4–5.3)
PROT SERPL-MCNC: 6.3 G/DL (ref 6.4–8.3)
RBC # BLD AUTO: 4.24 10E6/UL (ref 3.8–5.2)
SODIUM SERPL-SCNC: 135 MMOL/L (ref 136–145)
WBC # BLD AUTO: 6.7 10E3/UL (ref 4–11)

## 2023-02-14 PROCEDURE — 258N000003 HC RX IP 258 OP 636: Performed by: NURSE ANESTHETIST, CERTIFIED REGISTERED

## 2023-02-14 PROCEDURE — 370N000017 HC ANESTHESIA TECHNICAL FEE, PER MIN: Performed by: INTERNAL MEDICINE

## 2023-02-14 PROCEDURE — 85027 COMPLETE CBC AUTOMATED: CPT | Performed by: INTERNAL MEDICINE

## 2023-02-14 PROCEDURE — 82962 GLUCOSE BLOOD TEST: CPT

## 2023-02-14 PROCEDURE — 82150 ASSAY OF AMYLASE: CPT | Performed by: INTERNAL MEDICINE

## 2023-02-14 PROCEDURE — 250N000011 HC RX IP 250 OP 636: Performed by: NURSE ANESTHETIST, CERTIFIED REGISTERED

## 2023-02-14 PROCEDURE — 88305 TISSUE EXAM BY PATHOLOGIST: CPT | Mod: 26 | Performed by: PATHOLOGY

## 2023-02-14 PROCEDURE — 710N000009 HC RECOVERY PHASE 1, LEVEL 1, PER MIN: Performed by: INTERNAL MEDICINE

## 2023-02-14 PROCEDURE — 999N000179 XR SURGERY CARM FLUORO LESS THAN 5 MIN W STILLS: Mod: TC

## 2023-02-14 PROCEDURE — 36415 COLL VENOUS BLD VENIPUNCTURE: CPT | Performed by: INTERNAL MEDICINE

## 2023-02-14 PROCEDURE — 250N000009 HC RX 250: Performed by: NURSE ANESTHETIST, CERTIFIED REGISTERED

## 2023-02-14 PROCEDURE — 83690 ASSAY OF LIPASE: CPT | Performed by: INTERNAL MEDICINE

## 2023-02-14 PROCEDURE — 999N000010 HC STATISTIC ANES STAT CODE-CRNA PER MINUTE: Performed by: INTERNAL MEDICINE

## 2023-02-14 PROCEDURE — 85610 PROTHROMBIN TIME: CPT | Performed by: INTERNAL MEDICINE

## 2023-02-14 PROCEDURE — 255N000002 HC RX 255 OP 636: Performed by: INTERNAL MEDICINE

## 2023-02-14 PROCEDURE — 360N000084 HC SURGERY LEVEL 4 W/ FLUORO, PER MIN: Performed by: INTERNAL MEDICINE

## 2023-02-14 PROCEDURE — 710N000012 HC RECOVERY PHASE 2, PER MINUTE: Performed by: INTERNAL MEDICINE

## 2023-02-14 PROCEDURE — 999N000141 HC STATISTIC PRE-PROCEDURE NURSING ASSESSMENT: Performed by: INTERNAL MEDICINE

## 2023-02-14 PROCEDURE — 250N000025 HC SEVOFLURANE, PER MIN: Performed by: INTERNAL MEDICINE

## 2023-02-14 PROCEDURE — 272N000001 HC OR GENERAL SUPPLY STERILE: Performed by: INTERNAL MEDICINE

## 2023-02-14 PROCEDURE — C1769 GUIDE WIRE: HCPCS | Performed by: INTERNAL MEDICINE

## 2023-02-14 PROCEDURE — 80053 COMPREHEN METABOLIC PANEL: CPT | Performed by: INTERNAL MEDICINE

## 2023-02-14 PROCEDURE — 88305 TISSUE EXAM BY PATHOLOGIST: CPT | Mod: TC | Performed by: INTERNAL MEDICINE

## 2023-02-14 PROCEDURE — C1726 CATH, BAL DIL, NON-VASCULAR: HCPCS | Performed by: INTERNAL MEDICINE

## 2023-02-14 RX ORDER — LIDOCAINE HYDROCHLORIDE 20 MG/ML
INJECTION, SOLUTION INFILTRATION; PERINEURAL PRN
Status: DISCONTINUED | OUTPATIENT
Start: 2023-02-14 | End: 2023-02-14

## 2023-02-14 RX ORDER — SODIUM CHLORIDE, SODIUM LACTATE, POTASSIUM CHLORIDE, CALCIUM CHLORIDE 600; 310; 30; 20 MG/100ML; MG/100ML; MG/100ML; MG/100ML
INJECTION, SOLUTION INTRAVENOUS CONTINUOUS
Status: CANCELLED | OUTPATIENT
Start: 2023-02-14

## 2023-02-14 RX ORDER — LABETALOL 20 MG/4 ML (5 MG/ML) INTRAVENOUS SYRINGE
PRN
Status: DISCONTINUED | OUTPATIENT
Start: 2023-02-14 | End: 2023-02-14

## 2023-02-14 RX ORDER — ONDANSETRON 4 MG/1
4 TABLET, ORALLY DISINTEGRATING ORAL EVERY 30 MIN PRN
Status: CANCELLED | OUTPATIENT
Start: 2023-02-14

## 2023-02-14 RX ORDER — FLUMAZENIL 0.1 MG/ML
0.2 INJECTION, SOLUTION INTRAVENOUS
Status: DISCONTINUED | OUTPATIENT
Start: 2023-02-14 | End: 2023-02-14 | Stop reason: HOSPADM

## 2023-02-14 RX ORDER — ONDANSETRON 2 MG/ML
4 INJECTION INTRAMUSCULAR; INTRAVENOUS EVERY 6 HOURS PRN
Status: DISCONTINUED | OUTPATIENT
Start: 2023-02-14 | End: 2023-02-14 | Stop reason: HOSPADM

## 2023-02-14 RX ORDER — OXYCODONE HYDROCHLORIDE 5 MG/1
5 TABLET ORAL EVERY 4 HOURS PRN
Status: CANCELLED | OUTPATIENT
Start: 2023-02-14

## 2023-02-14 RX ORDER — FENTANYL CITRATE 50 UG/ML
50 INJECTION, SOLUTION INTRAMUSCULAR; INTRAVENOUS EVERY 5 MIN PRN
Status: CANCELLED | OUTPATIENT
Start: 2023-02-14

## 2023-02-14 RX ORDER — PROPOFOL 10 MG/ML
INJECTION, EMULSION INTRAVENOUS PRN
Status: DISCONTINUED | OUTPATIENT
Start: 2023-02-14 | End: 2023-02-14

## 2023-02-14 RX ORDER — LIDOCAINE 40 MG/G
CREAM TOPICAL
Status: DISCONTINUED | OUTPATIENT
Start: 2023-02-14 | End: 2023-02-14 | Stop reason: HOSPADM

## 2023-02-14 RX ORDER — HYDROMORPHONE HCL IN WATER/PF 6 MG/30 ML
0.4 PATIENT CONTROLLED ANALGESIA SYRINGE INTRAVENOUS EVERY 5 MIN PRN
Status: CANCELLED | OUTPATIENT
Start: 2023-02-14

## 2023-02-14 RX ORDER — OXYCODONE HYDROCHLORIDE 10 MG/1
10 TABLET ORAL EVERY 4 HOURS PRN
Status: DISCONTINUED | OUTPATIENT
Start: 2023-02-14 | End: 2023-02-14 | Stop reason: HOSPADM

## 2023-02-14 RX ORDER — KETOROLAC TROMETHAMINE 30 MG/ML
15 INJECTION, SOLUTION INTRAMUSCULAR; INTRAVENOUS
Status: CANCELLED | OUTPATIENT
Start: 2023-02-14

## 2023-02-14 RX ORDER — NALOXONE HYDROCHLORIDE 0.4 MG/ML
0.2 INJECTION, SOLUTION INTRAMUSCULAR; INTRAVENOUS; SUBCUTANEOUS
Status: DISCONTINUED | OUTPATIENT
Start: 2023-02-14 | End: 2023-02-14 | Stop reason: HOSPADM

## 2023-02-14 RX ORDER — FENTANYL CITRATE 50 UG/ML
25 INJECTION, SOLUTION INTRAMUSCULAR; INTRAVENOUS EVERY 5 MIN PRN
Status: CANCELLED | OUTPATIENT
Start: 2023-02-14

## 2023-02-14 RX ORDER — MEPERIDINE HYDROCHLORIDE 25 MG/ML
12.5 INJECTION INTRAMUSCULAR; INTRAVENOUS; SUBCUTANEOUS EVERY 5 MIN PRN
Status: CANCELLED | OUTPATIENT
Start: 2023-02-14

## 2023-02-14 RX ORDER — NALOXONE HYDROCHLORIDE 0.4 MG/ML
0.4 INJECTION, SOLUTION INTRAMUSCULAR; INTRAVENOUS; SUBCUTANEOUS
Status: DISCONTINUED | OUTPATIENT
Start: 2023-02-14 | End: 2023-02-14 | Stop reason: HOSPADM

## 2023-02-14 RX ORDER — ONDANSETRON 2 MG/ML
4 INJECTION INTRAMUSCULAR; INTRAVENOUS EVERY 30 MIN PRN
Status: CANCELLED | OUTPATIENT
Start: 2023-02-14

## 2023-02-14 RX ORDER — OXYCODONE HYDROCHLORIDE 5 MG/1
5 TABLET ORAL EVERY 4 HOURS PRN
Status: DISCONTINUED | OUTPATIENT
Start: 2023-02-14 | End: 2023-02-14 | Stop reason: HOSPADM

## 2023-02-14 RX ORDER — ONDANSETRON 4 MG/1
4 TABLET, ORALLY DISINTEGRATING ORAL EVERY 6 HOURS PRN
Status: DISCONTINUED | OUTPATIENT
Start: 2023-02-14 | End: 2023-02-14 | Stop reason: HOSPADM

## 2023-02-14 RX ORDER — DEXAMETHASONE SODIUM PHOSPHATE 4 MG/ML
INJECTION, SOLUTION INTRA-ARTICULAR; INTRALESIONAL; INTRAMUSCULAR; INTRAVENOUS; SOFT TISSUE PRN
Status: DISCONTINUED | OUTPATIENT
Start: 2023-02-14 | End: 2023-02-14

## 2023-02-14 RX ORDER — ESMOLOL HYDROCHLORIDE 10 MG/ML
INJECTION INTRAVENOUS PRN
Status: DISCONTINUED | OUTPATIENT
Start: 2023-02-14 | End: 2023-02-14

## 2023-02-14 RX ORDER — ONDANSETRON 2 MG/ML
INJECTION INTRAMUSCULAR; INTRAVENOUS PRN
Status: DISCONTINUED | OUTPATIENT
Start: 2023-02-14 | End: 2023-02-14

## 2023-02-14 RX ORDER — SODIUM CHLORIDE, SODIUM LACTATE, POTASSIUM CHLORIDE, CALCIUM CHLORIDE 600; 310; 30; 20 MG/100ML; MG/100ML; MG/100ML; MG/100ML
INJECTION, SOLUTION INTRAVENOUS CONTINUOUS PRN
Status: DISCONTINUED | OUTPATIENT
Start: 2023-02-14 | End: 2023-02-14

## 2023-02-14 RX ORDER — LEVOFLOXACIN 5 MG/ML
INJECTION, SOLUTION INTRAVENOUS PRN
Status: DISCONTINUED | OUTPATIENT
Start: 2023-02-14 | End: 2023-02-14

## 2023-02-14 RX ORDER — HYDROMORPHONE HCL IN WATER/PF 6 MG/30 ML
0.2 PATIENT CONTROLLED ANALGESIA SYRINGE INTRAVENOUS EVERY 5 MIN PRN
Status: CANCELLED | OUTPATIENT
Start: 2023-02-14

## 2023-02-14 RX ORDER — ACETAMINOPHEN 325 MG/1
975 TABLET ORAL
Status: DISCONTINUED | OUTPATIENT
Start: 2023-02-14 | End: 2023-02-14 | Stop reason: HOSPADM

## 2023-02-14 RX ORDER — FENTANYL CITRATE 50 UG/ML
25 INJECTION, SOLUTION INTRAMUSCULAR; INTRAVENOUS
Status: DISCONTINUED | OUTPATIENT
Start: 2023-02-14 | End: 2023-02-14 | Stop reason: HOSPADM

## 2023-02-14 RX ORDER — ALBUTEROL SULFATE 0.83 MG/ML
2.5 SOLUTION RESPIRATORY (INHALATION) EVERY 4 HOURS PRN
Status: CANCELLED | OUTPATIENT
Start: 2023-02-14

## 2023-02-14 RX ORDER — LABETALOL HYDROCHLORIDE 5 MG/ML
5 INJECTION, SOLUTION INTRAVENOUS EVERY 5 MIN PRN
Status: CANCELLED | OUTPATIENT
Start: 2023-02-14

## 2023-02-14 RX ORDER — IOPAMIDOL 510 MG/ML
INJECTION, SOLUTION INTRAVASCULAR PRN
Status: DISCONTINUED | OUTPATIENT
Start: 2023-02-14 | End: 2023-02-14 | Stop reason: HOSPADM

## 2023-02-14 RX ORDER — FENTANYL CITRATE 50 UG/ML
INJECTION, SOLUTION INTRAMUSCULAR; INTRAVENOUS PRN
Status: DISCONTINUED | OUTPATIENT
Start: 2023-02-14 | End: 2023-02-14

## 2023-02-14 RX ADMIN — PHENYLEPHRINE HYDROCHLORIDE 100 MCG: 10 INJECTION INTRAVENOUS at 10:16

## 2023-02-14 RX ADMIN — Medication 40 MG: at 10:03

## 2023-02-14 RX ADMIN — PHENYLEPHRINE HYDROCHLORIDE 100 MCG: 10 INJECTION INTRAVENOUS at 10:19

## 2023-02-14 RX ADMIN — DEXAMETHASONE SODIUM PHOSPHATE 4 MG: 4 INJECTION, SOLUTION INTRA-ARTICULAR; INTRALESIONAL; INTRAMUSCULAR; INTRAVENOUS; SOFT TISSUE at 10:02

## 2023-02-14 RX ADMIN — SUGAMMADEX 200 MG: 100 INJECTION, SOLUTION INTRAVENOUS at 11:21

## 2023-02-14 RX ADMIN — FENTANYL CITRATE 50 MCG: 50 INJECTION, SOLUTION INTRAMUSCULAR; INTRAVENOUS at 10:00

## 2023-02-14 RX ADMIN — PROPOFOL 80 MG: 10 INJECTION, EMULSION INTRAVENOUS at 10:01

## 2023-02-14 RX ADMIN — ONDANSETRON 4 MG: 2 INJECTION INTRAMUSCULAR; INTRAVENOUS at 11:17

## 2023-02-14 RX ADMIN — ESMOLOL HYDROCHLORIDE 10 MG: 10 INJECTION, SOLUTION INTRAVENOUS at 10:55

## 2023-02-14 RX ADMIN — SODIUM CHLORIDE, POTASSIUM CHLORIDE, SODIUM LACTATE AND CALCIUM CHLORIDE: 600; 310; 30; 20 INJECTION, SOLUTION INTRAVENOUS at 09:51

## 2023-02-14 RX ADMIN — FENTANYL CITRATE 50 MCG: 50 INJECTION, SOLUTION INTRAMUSCULAR; INTRAVENOUS at 10:02

## 2023-02-14 RX ADMIN — LEVOFLOXACIN 500 MG: 5 INJECTION, SOLUTION INTRAVENOUS at 10:09

## 2023-02-14 RX ADMIN — LABETALOL 20 MG/4 ML (5 MG/ML) INTRAVENOUS SYRINGE 5 MG: at 11:00

## 2023-02-14 RX ADMIN — ESMOLOL HYDROCHLORIDE 10 MG: 10 INJECTION, SOLUTION INTRAVENOUS at 10:50

## 2023-02-14 RX ADMIN — ESMOLOL HYDROCHLORIDE 10 MG: 10 INJECTION, SOLUTION INTRAVENOUS at 10:38

## 2023-02-14 RX ADMIN — LIDOCAINE HYDROCHLORIDE 60 MG: 20 INJECTION, SOLUTION INFILTRATION; PERINEURAL at 10:01

## 2023-02-14 ASSESSMENT — LIFESTYLE VARIABLES: TOBACCO_USE: 0

## 2023-02-14 ASSESSMENT — ACTIVITIES OF DAILY LIVING (ADL)
ADLS_ACUITY_SCORE: 35
ADLS_ACUITY_SCORE: 20
ADLS_ACUITY_SCORE: 20

## 2023-02-14 NOTE — ANESTHESIA PROCEDURE NOTES
Airway         Procedure Start/Stop Times: 2/14/2023 10:05 AM  Staff -        Other Anesthesia Staff: Noser, Marilu       Performed By: SRNAIndications and Patient Condition       Indications for airway management: megan-procedural       Induction type:intravenous       Mask difficulty assessment: 1 - vent by mask    Final Airway Details       Final airway type: endotracheal airway       Successful airway: ETT - single  Endotracheal Airway Details        ETT size (mm): 7.0       Cuffed: yes       Successful intubation technique: direct laryngoscopy       DL Blade Type: Castro 2       Grade View of Cords: 1       Adjucts: stylet       Position: Right       Measured from: gums/teeth       Secured at (cm): 21       Bite block used: None    Post intubation assessment        Placement verified by: capnometry, equal breath sounds and chest rise        Number of attempts at approach: 1       Number of other approaches attempted: 0       Secured with: pink tape       Ease of procedure: easy       Dentition: Intact and Unchanged    Medication(s) Administered   Medication Administration Time: 2/14/2023 10:05 AM

## 2023-02-14 NOTE — ANESTHESIA CARE TRANSFER NOTE
Patient: Marcela Kim    Procedure: Procedure(s):  ENDOSCOPIC RETROGRADE CHOLANGIOPANCREATOGRAPHY, WITH DIRECT DUCT VISUALIZATION, USING PANCREATICOBILIARY FIBEROPTIC PROBE, BILE DUCT BIOPSIES, STENT REMOVAL       Diagnosis: Abnormal finding on GI tract imaging [R93.3]  Diagnosis Additional Information: No value filed.    Anesthesia Type:   General     Note:    Oropharynx: oropharynx clear of all foreign objects and spontaneously breathing  Level of Consciousness: awake and drowsy  Oxygen Supplementation: face mask  Level of Supplemental Oxygen (L/min / FiO2): 6  Independent Airway: airway patency satisfactory and stable  Dentition: dentition unchanged  Vital Signs Stable: post-procedure vital signs reviewed and stable  Report to RN Given: handoff report given  Patient transferred to: PACU    Handoff Report: Identifed the Patient, Identified the Reponsible Provider, Reviewed the pertinent medical history, Discussed the surgical course, Reviewed Intra-OP anesthesia mangement and issues during anesthesia, Set expectations for post-procedure period and Allowed opportunity for questions and acknowledgement of understanding      Vitals:  Vitals Value Taken Time   BP     Temp     Pulse     Resp     SpO2         Electronically Signed By: CAROLYN Blanton CRNA  February 14, 2023  11:30 AM

## 2023-02-14 NOTE — ANESTHESIA POSTPROCEDURE EVALUATION
Patient: Marcela Kim    Procedure: Procedure(s):  ENDOSCOPIC RETROGRADE CHOLANGIOPANCREATOGRAPHY, WITH DIRECT DUCT VISUALIZATION, USING PANCREATICOBILIARY FIBEROPTIC PROBE, BILE DUCT BIOPSIES, STENT REMOVAL       Anesthesia Type:  General    Note:  Disposition: Outpatient   Postop Pain Control: Uneventful            Sign Out: Well controlled pain   PONV: No   Neuro/Psych: Uneventful            Sign Out: Acceptable/Baseline neuro status   Airway/Respiratory: Uneventful            Sign Out: Acceptable/Baseline resp. status   CV/Hemodynamics: Uneventful            Sign Out: Acceptable CV status; No obvious hypovolemia; No obvious fluid overload   Other NRE: NONE   DID A NON-ROUTINE EVENT OCCUR? No           Last vitals:  Vitals Value Taken Time   /54 02/14/23 1230   Temp 36.6  C (97.9  F) 02/14/23 1200   Pulse 64 02/14/23 1231   Resp 12 02/14/23 1231   SpO2 94 % 02/14/23 1231   Vitals shown include unvalidated device data.    Electronically Signed By: Jesenia Chopra MD  February 14, 2023  12:32 PM

## 2023-02-14 NOTE — DISCHARGE INSTRUCTIONS
[facility]  Same-Day Surgery   Adult Discharge Orders & Instructions   - Await pathology results   - Patient will follow up with Dr. Gale in clinic in 3 months   [facility]      Same-Day Surgery ERCP Procedure   Adult Discharge Orders & Instructions     You had a procedure known as an Endoscopic Retrograde Cholangiopancreatography (ERCP). Your healthcare provider performed the ERCP to look at your bile or pancreatic ducts, and to locate and/or treat blockages (dilation, stenting, removal, etc.) in these ducts. Often biopsies, small samples of tissue, are taken to help diagnose and/or classify stages of disease growth. This procedure is used to diagnose diseases of the pancreas, bile ducts, pancreatic duct, liver, and gallbladder.     After your procedure   Make sure to clarify with your healthcare provider any diet restrictions (For example, clear liquid, low fat, no caffeine, etc.)   Do NOT take aspirin containing medications or any other blood-thinning medicines (anticoagulants) until your healthcare provider says it's OK.  OK to continue Aspirin , resume Plavix 2/15/2023.  You MAY be prescribed antibiotics, depending on what was done and/or found during your EUS, make sure to take antibiotics as prescribed by your healthcare provider    For 24 hours after surgery  Get plenty of rest.  A responsible adult must stay with you for at least 24 hours after you leave the hospital.   Do not drive or use heavy equipment.  If you have weakness or tingling, don't drive or use heavy equipment until this feeling goes away.  Do not drink alcohol.  Avoid strenuous or risky activities (gym, yoga, cycling, etc.).  Ask for help when climbing stairs.   You may feel lightheaded.  IF so, sit for a few minutes before standing.  Have someone help you get up.   If you have nausea (feel sick to your stomach): Drink only clear liquids such as apple juice,  ginger ale, broth or 7-Up.  Rest may also help.  Be sure to drink enough fluids.  Move to a regular diet as you feel able.  If you feel bloated or have too much gas, use a heating pad on your belly to help reduce the discomfort. This should help you feel better.   You may have a slight fever. This is normal for the first 24 hours.   You may have a dry mouth, a sore throat, muscle aches or trouble sleeping.  These are normal and will go away after 24 hours. A sore throat is most common. Use lozenges or gargle with salt water to ease the discomfort.   Do not make important or legal decisions.      Call your doctor for any of the following:  Chest pain, and/or shortness of breath  Abdominal  pain, bloating or cramping that has not improved or does not respond to pain reliving medications (Tylenol or narcotics if prescribed)   Difficulty swallowing or feeling as though food or liquids are stuck in your throat   Sore throat lasting more than 2 days or pain that has worsened over time   Black or tarry stools   Nausea and/or vomiting that is not resolving or has not responded to anti-nausea medications prescribed to you   It has been over 8 to 10 hours since surgery and you are still not able to urinate (pass water)   Headache for over 24 hours   Fever over 100.5 F (38 C) lasting more than 24 hours after the procedure   Signs of jaundice or blockage (fever, chills, abdominal pain, yellowing of the whites of your eyes, yellowing of your skin, and/or passing darker than normal urine)       Take it easy when you get home.  Remember, same day surgery DOES NOT MEAN SAME DAY RECOVERY!  Healing is a gradual process.  You will need some time to recover - you may be more tired than you realize at first.  Rest and relax for at least the first 24 hours at home.  You'll feel better and heal faster if you take good care of yourself.          For 24 hours after surgery    Get plenty of rest.  A responsible adult must stay with you for  at least 24 hours after you leave the hospital.   Do not drive or use heavy equipment.  If you have weakness or tingling, don't drive or use heavy equipment until this feeling goes away.  Do not drink alcohol.  Avoid strenuous or risky activities.  Ask for help when climbing stairs.   You may feel lightheaded.  IF so, sit for a few minutes before standing.  Have someone help you get up.   If you have nausea (feel sick to your stomach): Drink only clear liquids such as apple juice, ginger ale, broth or 7-Up.  Rest may also help.  Be sure to drink enough fluids.  Move to a regular diet as you feel able.  You may have a slight fever. Call the doctor if your fever is over 100 F (37.7 C) (taken under the tongue) or lasts longer than 24 hours.  You may have a dry mouth, a sore throat, muscle aches or trouble sleeping.  These should go away after 24 hours.  Do not make important or legal decisions.   Call your doctor for any of the followin.  Signs of infection (fever, growing tenderness at the surgery site, a large amount of drainage or bleeding, severe pain, foul-smelling drainage, redness, swelling).    2. It has been over 8 to 10 hours since surgery and you are still not able to urinate (pass water).    3.  Headache for over 24 hours.    4.  Numbness, tingling or weakness the day after surgery (if you had spinal anesthesia).  To contact a doctor, call Baljinder [phone]  before 430 PM or after 430 PM Call     [phone] and ask for the resident on call for   GI (answered 24 hours a day)

## 2023-02-14 NOTE — BRIEF OP NOTE
[facility]    Brief Operative Note  Marcela Kim is an 87 year old female with a PMHx of choledocholithiasis s/p partial extraction of biliary stones and biliary stent placement who presents for an ERCP wit possible EHL. She had an ERCP on 9/6/22 in which she was found to have multiple stones which some of them were removed. There was one large stone impacted in the hilum that could not be removed. 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 Hilar bile duct mass and right hepatic duct mass biopsies were obtained and results showed no dysplasia or malignancy. She had a 10 Fr x 7 cm DPT SOLUS biliary stent was placed with upper pigtail unfurled into left hepatic duct. She presents for an ERCP for stent exchange.     Pre-operative diagnosis: Abnormal finding on GI tract imaging [R93.3]  Post-operative diagnosis Same as pre-operative diagnosis    Procedure: Procedure(s):  ENDOSCOPIC RETROGRADE CHOLANGIOPANCREATOGRAPHY, WITH DIRECT DUCT VISUALIZATION, USING PANCREATICOBILIARY FIBEROPTIC PROBE, BILE DUCT BIOPSIES, STENT REMOVAL  Surgeon: Surgeon(s) and Role:     * Chaz Gale MD - Primary     * Renate Joshua MD - Fellow - Assisting  Anesthesia: General   Estimated Blood Loss: None    Drains: None  Specimens:   ID Type Source Tests Collected by Time Destination   1 : Bile Duct Biopsies Biopsy Common Bile Duct SURGICAL PATHOLOGY EXAM Chaz Gale MD 2/14/2023 11:15 AM      Findings:     - One partially occluded stent from the biliary tree was seen in the major papilla and was removed with with a snare   - The major papilla was adjacent to a diverticulum.   - Prior biliary sphincterotomy appeared open and selective biliary cannulation was performed   - SpyGlass direct visualization system was examined in the common bile duct,  right and left intrahepatic duct. Mucosa with a villiform appearance was found in the hepatic duct bifurcation but appeared much improved compared to recent SpyGlass examination. This is now suggestive of inflammatory changes due to biliary stents as opposed to malignancy. The area was biopsied with Spybite.   - Decision was made to not place any stents in the common bile duct    Complications: None.  Implants:   Implant Name Type Inv. Item Serial No.  Lot No. LRB No. Used Action   STENT SOLUS BILIARY [id number] DBL PIGTAIL W/INTRO [id number] - [id number] Stent STENT SOLUS BILIARY [id number] DBL PIGTAIL W/INTRO [id number]  [id number] N/A 1 Explanted     Recommendations  - Observe patient in same day observation unit for ongoing care with plans for discharge to home later today   - Await pathology results   - Patient will follow up with Dr. Gale in clinic in 3 months   - The findings and recommendations were discussed with the patient and their family

## 2023-02-14 NOTE — ANESTHESIA PREPROCEDURE EVALUATION
Anesthesia Pre-Procedure Evaluation    Patient: Marcela Kim   MRN: [id number] : 1935        Procedure : Procedure(s):  ENDOSCOPIC RETROGRADE CHOLANGIOPANCREATOGRAPHY, WITH DIRECT DUCT VISUALIZATION, USING PANCREATICOBILIARY FIBEROPTIC PROBE          Past Medical History:   Diagnosis Date     Antiplatelet or antithrombotic long-term use      ASCVD (arteriosclerotic cardiovascular disease)      Diabetes (H)      Hypertension      Stented coronary artery      Thyroid disease       Past Surgical History:   Procedure Laterality Date     AS TOTAL ABDOM HYSTERECTOMY       BACK SURGERY       COLECTOMY PARTIAL       ENDOSCOPIC RETROGRADE CHOLANGIOPANCREATOGRAM WITH DIRECT VISUALIZATION SYSTEM AND GENERATOR N/A 2023    Procedure: ENDOSCOPIC RETROGRADE CHOLANGIOPANCREATOGRAPHY,SPYGLASS DIRECT VISUALIZATION SYSTEM cholangioscopy with spybite biopsies, bile duct stent placed, balloon sweep of bile duct for sludge;  Surgeon: Chaz Gale MD;  Location: [facility] OR     EYE SURGERY       Stent      ASCVD Stent of LAD and PTCA       Allergies   Allergen Reactions     Lisinopril Hives and Rash     Rash       Hydralazine Dizziness     Head fullness, feeling unwell.  Avoid dose of 25mg twice daily.        Amlodipine      Other reaction(s): edema     Gabapentin      Other reaction(s): dizziness     Omeprazole Hives     Pt taking. States not allergic.        Social History     Tobacco Use     Smoking status: Never     Smokeless tobacco: Never   Substance Use Topics     Alcohol use: Not Currently     Comment: rare      Wt Readings from Last 1 Encounters:   23 56.1 kg (123 lb 10.9 oz)        Anesthesia Evaluation   Pt has had prior anesthetic. Type: General.        ROS/MED HX  ENT/Pulmonary:    (-) tobacco use   Neurologic:       Cardiovascular:     (+) Dyslipidemia hypertension--CAD --stent (LAD)-Taking blood thinners     METS/Exercise Tolerance:     Hematologic:       Musculoskeletal:       GI/Hepatic:  Comment: Choledocholithiasis s/p ERCP and stent at OSH    (+) GERD (s/p prior EGD for gastric ulcer),     Renal/Genitourinary:       Endo:     (+) type II DM, Using insulin, thyroid problem, hypothyroidism,     Psychiatric/Substance Use:       Infectious Disease:       Malignancy:       Other:            Physical Exam    Airway        Mallampati: II   TM distance: > 3 FB   Neck ROM: full   Mouth opening: > 3 cm    Respiratory Devices and Support         Dental       (+) Modest Abnormalities - crowns, retainers, 1 or 2 missing teeth      Cardiovascular             Pulmonary                   OUTSIDE LABS:  CBC:   Lab Results   Component Value Date    WBC 6.7 02/14/2023    WBC 6.3 01/17/2023    HGB 12.4 02/14/2023    HGB 12.1 01/17/2023    HCT 38.5 02/14/2023    HCT 37.7 01/17/2023     02/14/2023     01/17/2023     BMP:   Lab Results   Component Value Date     (L) 01/17/2023    POTASSIUM 4.0 01/17/2023    CHLORIDE 102 01/17/2023    CO2 20 (L) 01/17/2023    BUN 19.7 01/17/2023    CR 0.86 01/17/2023     (H) 01/17/2023     (H) 01/17/2023     COAGS:   Lab Results   Component Value Date    INR 1.09 02/14/2023     POC: No results found for: BGM, HCG, HCGS  HEPATIC:   Lab Results   Component Value Date    ALBUMIN 3.6 01/17/2023    PROTTOTAL 6.0 (L) 01/17/2023    ALT 8 (L) 01/17/2023    AST 17 01/17/2023    ALKPHOS 131 (H) 01/17/2023    BILITOTAL 0.7 01/17/2023     OTHER:   Lab Results   Component Value Date    LIZETT 8.8 01/17/2023    LIPASE 15 01/17/2023    AMYLASE 41 01/17/2023       Anesthesia Plan    ASA Status:  3   NPO Status:  NPO Appropriate    Anesthesia Type: General.     - Airway: ETT   Induction: Intravenous, Propofol.   Maintenance: Balanced.        Consents    Anesthesia Plan(s) and associated risks, benefits, and realistic alternatives discussed. Questions answered and patient/representative(s) expressed understanding.    - Discussed:     - Discussed with:  Patient      -  Extended Intubation/Ventilatory Support Discussed: No.      - Patient is DNR/DNI Status: No         Postoperative Care    Pain management: IV analgesics.   PONV prophylaxis: Ondansetron (or other 5HT-3), Dexamethasone or Solumedrol     Comments:                Chris Rodriguez MD

## 2023-02-15 LAB
PATH REPORT.COMMENTS IMP SPEC: NORMAL
PATH REPORT.COMMENTS IMP SPEC: NORMAL
PATH REPORT.FINAL DX SPEC: NORMAL
PATH REPORT.GROSS SPEC: NORMAL
PATH REPORT.MICROSCOPIC SPEC OTHER STN: NORMAL
PATH REPORT.RELEVANT HX SPEC: NORMAL
PHOTO IMAGE: NORMAL

## 2023-02-16 ENCOUNTER — TELEPHONE (OUTPATIENT)
Dept: GASTROENTEROLOGY | Facility: CLINIC | Age: 88
End: 2023-02-16
Payer: COMMERCIAL

## 2023-02-16 NOTE — TELEPHONE ENCOUNTER
Returned call to pt's daughter Giselle. LVM with k pod number. Per RN, pt had ERCP on 2/14, should follow up in April 2023 with Dr. Gale - virtual clinic along w pts daughter. Will Send EVRYTHNGhart.

## 2023-03-30 ENCOUNTER — DOCUMENTATION ONLY (OUTPATIENT)
Dept: GASTROENTEROLOGY | Facility: CLINIC | Age: 88
End: 2023-03-30
Payer: COMMERCIAL

## 2023-03-30 NOTE — PROGRESS NOTES
Called PT's daughter and confirmed Appt.    Called to remind patient of their upcoming appointment with our GI clinic, on 04/05/23 at 3:45 PM with Dr. Chaz Gale. This appointment is scheduled as a video visit. You will receive a call approximately 30 minutes prior to check you in, you must be in [place] for this visit., if your appointment is virtual (video or telephone) you need to be in [place] for the visit. To reschedule or cancel patient to call [phone].      SK

## 2023-04-01 ENCOUNTER — HEALTH MAINTENANCE LETTER (OUTPATIENT)
Age: 88
End: 2023-04-01

## 2023-04-05 ENCOUNTER — VIRTUAL VISIT (OUTPATIENT)
Dept: GASTROENTEROLOGY | Facility: CLINIC | Age: 88
End: 2023-04-05
Payer: COMMERCIAL

## 2023-04-05 VITALS — BODY MASS INDEX: 22.67 KG/M2 | WEIGHT: 120 LBS

## 2023-04-05 DIAGNOSIS — R93.3 ABNORMAL FINDING ON GI TRACT IMAGING: Primary | ICD-10-CM

## 2023-04-05 PROCEDURE — 99213 OFFICE O/P EST LOW 20 MIN: CPT | Mod: VID | Performed by: INTERNAL MEDICINE

## 2023-04-05 ASSESSMENT — PAIN SCALES - GENERAL: PAINLEVEL: NO PAIN (0)

## 2023-04-05 NOTE — LETTER
4/5/2023         RE: Marcela [employer]  [address]  [place] [address]        Joined the call at 4/5/2023, 3:50:56 pm.  Left the call at 4/5/2023, 3:58:37 pm.  You were on the call for 7 minutes 40 seconds .  Total 15 minutes including review of data, biopsies, procedures    Marcela is a delightful 87-year-old kindly referred by Dr. 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  This is follow-up after the second of 2 ERCPs with spyglass cholangioscopy and spy bite biopsy and finally results below with request inflammatory appearing narrowing which we felt was compatible after all the results back and trajectory of being in a benign inflammatory response to large bile duct stones    Marcela and her daughter and I spent approximately 8 minutes on a video visit reviewing that we are quite confident now that this is not a cholangiocarcinoma as there was no dysplasia or malignancy on multiple biopsies and the lesion appears to be regressing.  Our decision is not to survey it and given her age not to focus on that or do any more imaging or procedures.  They appeared relieved that this is nothing that will require more attention    She is back to her exercise classes driving her car and enjoying her expanding family through grandchildren and some of whom are getting  at present.    We will not plan on any follow-up and thank Dr. Harvey for kindly referring this patient  Case Report   Surgical Pathology Report                         Case: [id number]                                   Authorizing Provider:  Chaz Gale MD  Collected:           02/14/2023 11:15 AM           Ordering Location:      MAIN OR                 Received:            02/14/2023 11:35 AM           Pathologist:           Ray Greco DO                                                             Specimen:    Common Bile Duct, Bile  Duct Biopsies                                                       Final Diagnosis   A.  BILE DUCT, BIOPSY:  - Reactive bile duct epithelium  - No dysplasia or malignancy identified          ERCP 02/14/2023 10:01 AM [facility]   [address] [place] [address] [phone]     Endoscopy Department   _______________________________________________________________________________   Patient Name: Marcela Kim       Procedure Date: 2/14/2023 10:01 AM   MRN: [id number]                       Account Number: [id number]   YOB: 1935               Admit Type: Outpatient   Age: 87                               Room: [place] [address]   Gender: Female                        Note Status: Finalized   Attending MD: SVITLANA PUGH MD Pause for the Cause: Com _____________________________________________________________________________   P                                                                                     Impression:            - One unflurled SOLUS double pigtail stent removed                          from left hepatic duct                          - Wide open biliary sphincterotomy and diffusely                          dilated bile duct with only subtle filling defect,                          less than previous                          - SpyGlass direct visualization showed much regressed                          irregularity and fronds at the hepatic duct                          bifurcation but appeared much improved compared to                          recent SpyGlass examination.                          - Multiple biopsies ujnder direct vision with Spybite.                          - As no visible obstruction from remnant abnormality,                          and to minimize repeated procedures in this elderly                          patient, no stents were placed                          - Suspect the frond like lesion was inflammatory                           reactive due to long standing multiple bile duct                          stones, but still possible that it is                          cholangiocarcinoma, however we discussed w patient and                          rikyter that early diagnosis would not likely lead to                          effective treatment that would change outcomes, and                          goal is to minimize procedures and time in the hospital   Recommendation:        - Observe patient in same day observation unit for                          ongoing care with plans for discharge to home later                          today                          - Await pathology results, and will call pt and                          daughter w results                          - Order CBC, CMP, CA 19-9 best at [facility] as pt lives in [place]                          - Patient will follow up with Dr. Gale in clinic in                          2 months in virtual clinic along w pts daughter, to                          see how she is doing.                          - The findings and recommendations were discussed with                          the patient and their family                                                                                       Electronically signed by SHARMILA Gale   ________________________   CHAZ GALE MD   2/14/2023 4:02:39 PM   I was physically present for the entire viewing portion of the exam.   __________________________   Signature of teaching physician   [id number]/[id number] GALE MD     __________________   NAN PERERA,   Number of Addenda: 0     Note Initiated On: 2/14/2023 10:01 AM   Scope In:   Scope Out:              Chaz Gale MD

## 2023-04-05 NOTE — NURSING NOTE
Is the patient currently in the state of [place]? YES    Visit mode:VIDEO    If the visit is dropped, the patient can be reconnected by: VIDEO VISIT: Text to cell phone: [phone]    Will anyone else be joining the visit? YES: How would they like to receive their invitation? Text to cell phone: [phone]      How would you like to obtain your AVS? MyChart    Are changes needed to the allergy or medication list? YES: PT is taking 15 units of Lantis in the morning.instead of 10 units at bedtime.    Reason for visit: Return visit.    José Luis Shaw

## 2023-04-05 NOTE — PROGRESS NOTES
Joined the call at 4/5/2023, 3:50:56 pm.  Left the call at 4/5/2023, 3:58:37 pm.  You were on the call for 7 minutes 40 seconds .  Total 15 minutes including review of data, biopsies, procedures    Marcela is a delightful 87-year-old kindly referred by Dr. 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  This is follow-up after the second of 2 ERCPs with spyglass cholangioscopy and spy bite biopsy and finally results below with request inflammatory appearing narrowing which we felt was compatible after all the results back and trajectory of being in a benign inflammatory response to large bile duct stones    Marcela and her daughter and I spent approximately 8 minutes on a video visit reviewing that we are quite confident now that this is not a cholangiocarcinoma as there was no dysplasia or malignancy on multiple biopsies and the lesion appears to be regressing.  Our decision is not to survey it and given her age not to focus on that or do any more imaging or procedures.  They appeared relieved that this is nothing that will require more attention    She is back to her exercise classes driving her car and enjoying her expanding family through grandchildren and some of whom are getting  at present.    We will not plan on any follow-up and thank Dr. Harvey for kindly referring this patient  Case Report   Surgical Pathology Report                         Case: [id number]                                   Authorizing Provider:  Chaz Gale MD  Collected:           02/14/2023 11:15 AM           Ordering Location:     [facility] OR                 Received:            02/14/2023 11:35 AM           Pathologist:           Ray Greco DO                                                             Specimen:    Common Bile Duct, Bile Duct Biopsies                                                       Final Diagnosis   A.  BILE DUCT,  BIOPSY:  - Reactive bile duct epithelium  - No dysplasia or malignancy identified          ERCP 02/14/2023 10:01 AM [facility]   [address] [place] [address] [phone]     Endoscopy Department   _______________________________________________________________________________   Patient Name: Marcela Kim       Procedure Date: 2/14/2023 10:01 AM   MRN: [id number]                       Account Number: [id number]   YOB: 1935               Admit Type: Outpatient   Age: 87                               Room: [place] [address]   Gender: Female                        Note Status: Finalized   Attending MD: SVITLANA PUGH MD Pause for the Cause: Com _____________________________________________________________________________   P                                                                                     Impression:            - One unflurled SOLUS double pigtail stent removed                          from left hepatic duct                          - Wide open biliary sphincterotomy and diffusely                          dilated bile duct with only subtle filling defect,                          less than previous                          - SpyGlass direct visualization showed much regressed                          irregularity and fronds at the hepatic duct                          bifurcation but appeared much improved compared to                          recent SpyGlass examination.                          - Multiple biopsies ujnder direct vision with Spybite.                          - As no visible obstruction from remnant abnormality,                          and to minimize repeated procedures in this elderly                          patient, no stents were placed                          - Suspect the frond like lesion was inflammatory                          reactive due to long standing multiple bile duct                           stones, but still possible that it is                          cholangiocarcinoma, however we discussed w patient and                          katelin that early diagnosis would not likely lead to                          effective treatment that would change outcomes, and                          goal is to minimize procedures and time in the hospital   Recommendation:        - Observe patient in same day observation unit for                          ongoing care with plans for discharge to home later                          today                          - Await pathology results, and will call pt and                          daughter w results                          - Order CBC, CMP, CA 19-9 best at [facility] as pt lives in [place]                          - Patient will follow up with Dr. Gale in clinic in                          2 months in virtual clinic along w pts daughter, to                          see how she is doing.                          - The findings and recommendations were discussed with                          the patient and their family                                                                                       Electronically signed by SHARMILA Gale   ________________________   SVITLANA GALE MD   2/14/2023 4:02:39 PM   I was physically present for the entire viewing portion of the exam.   __________________________   Signature of teaching physician   [id number] GALE MD     __________________   NAN PERERA,   Number of Addenda: 0     Note Initiated On: 2/14/2023 10:01 AM   Scope In:   Scope Out:

## 2023-04-05 NOTE — LETTER
4/5/2023         RE: Marcela Kim  [address]  [place] [address]        Dear Colleague,    Thank you for referring your patient, Marcela Kim, to the [facility] [place]. Please see a copy of my visit note below.      Joined the call at 4/5/2023, 3:50:56 pm.  Left the call at 4/5/2023, 3:58:37 pm.  You were on the call for 7 minutes 40 seconds .  Total 15 minutes including review of data, biopsies, procedures    Marcela is a delightful 87-year-old kindly referred by Dr. 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  This is follow-up after the second of 2 ERCPs with spyglass cholangioscopy and spy bite biopsy and finally results below with request inflammatory appearing narrowing which we felt was compatible after all the results back and trajectory of being in a benign inflammatory response to large bile duct stones    Marcela and her daughter and I spent approximately 8 minutes on a video visit reviewing that we are quite confident now that this is not a cholangiocarcinoma as there was no dysplasia or malignancy on multiple biopsies and the lesion appears to be regressing.  Our decision is not to survey it and given her age not to focus on that or do any more imaging or procedures.  They appeared relieved that this is nothing that will require more attention    She is back to her exercise classes driving her car and enjoying her expanding family through grandchildren and some of whom are getting  at present.    We will not plan on any follow-up and thank Dr. Harvey for kindly referring this patient  Case Report   Surgical Pathology Report                         Case: [id number]                                   Authorizing Provider:  Chaz Gale MD  Collected:           02/14/2023 11:15 AM           Ordering Location:     [facility] OR                  Received:            02/14/2023 11:35 AM           Pathologist:           Ray Greco DO                                                             Specimen:    Common Bile Duct, Bile Duct Biopsies                                                       Final Diagnosis   A.  BILE DUCT, BIOPSY:  - Reactive bile duct epithelium  - No dysplasia or malignancy identified          ERCP 02/14/2023 10:01 AM [facility]   [address] [place] [address] [phone]     Endoscopy Department   _______________________________________________________________________________   Patient Name: Marcela Kim       Procedure Date: 2/14/2023 10:01 AM   MRN: [id number]                       Account Number: [id number]   YOB: 1935               Admit Type: Outpatient   Age: 87                               Room: [place] [address]   Gender: Female                        Note Status: Finalized   Attending MD: SVITLANA PUGH MD Pause for the Cause: Com _____________________________________________________________________________   P                                                                                     Impression:            - One unflurled SOLUS double pigtail stent removed                          from left hepatic duct                          - Wide open biliary sphincterotomy and diffusely                          dilated bile duct with only subtle filling defect,                          less than previous                          - SpyGlass direct visualization showed much regressed                          irregularity and fronds at the hepatic duct                          bifurcation but appeared much improved compared to                          recent SpyGlass examination.                          - Multiple biopsies ujnder direct vision with Spybite.                          - As no visible obstruction from remnant abnormality,                           and to minimize repeated procedures in this elderly                          patient, no stents were placed                          - Suspect the frond like lesion was inflammatory                          reactive due to long standing multiple bile duct                          stones, but still possible that it is                          cholangiocarcinoma, however we discussed w patient and                          rikyter that early diagnosis would not likely lead to                          effective treatment that would change outcomes, and                          goal is to minimize procedures and time in the hospital   Recommendation:        - Observe patient in same day observation unit for                          ongoing care with plans for discharge to home later                          today                          - Await pathology results, and will call pt and                          daughter w results                          - Order CBC, CMP, CA 19-9 best at [facility] as pt lives in [place]                          - Patient will follow up with Dr. Gale in clinic in                          2 months in virtual clinic along w pts daughter, to                          see how she is doing.                          - The findings and recommendations were discussed with                          the patient and their family                                                                                       Electronically signed by SHARMILA Gale   ________________________   SVITLANA GALE MD   2/14/2023 4:02:39 PM   I was physically present for the entire viewing portion of the exam.   __________________________   Signature of teaching physician   [id number] GALE MD     __________________   NAN PERERA,   Number of Addenda: 0     Note Initiated On: 2/14/2023 10:01 AM   Scope In:   Scope Out:            Again, thank you for  allowing me to participate in the care of your patient.      Sincerely,    Chaz Gale MD

## 2023-04-05 NOTE — PROGRESS NOTES
Virtual Visit Details    Type of service:  Video Visit    see above note    Originating Location (pt. Location): Home  Distant Location (provider location):  On-site  Platform used for Video Visit: Liz

## 2023-12-02 NOTE — OR NURSING
Pt met goals of phase 1 care. Pt's questions were answered and care needs addressed. Pt denies any additional concerns at this time. Writer deemed pt stable and appropriate to transition to phase II.    
- - -

## 2024-06-02 ENCOUNTER — HEALTH MAINTENANCE LETTER (OUTPATIENT)
Age: 89
End: 2024-06-02

## 2025-06-15 ENCOUNTER — HEALTH MAINTENANCE LETTER (OUTPATIENT)
Age: 89
End: 2025-06-15

## (undated) DEVICE — SOL WATER IRRIG 1000ML BOTTLE 2F7114

## (undated) DEVICE — KIT ENDO FIRST STEP DISINFECTANT 200ML W/POUCH EP-4

## (undated) DEVICE — FORCEP BIOPSY SPYBITE MAX 286CMX1.2MM M00546470

## (undated) DEVICE — ENDO BITE BLOCK ADULT OMNI-BLOC

## (undated) DEVICE — WIRE GUIDE 0.025"X270CM ANG VISIGLIDE G-240-2527A

## (undated) DEVICE — SOL NACL 0.9% IRRIG 1000ML BOTTLE 2F7124

## (undated) DEVICE — GLOVE BIOGEL PI ULTRATOUCH SZ 8.0 41180

## (undated) DEVICE — TUBING SUCTION 10'X3/16" N510

## (undated) DEVICE — SUCTION MANIFOLD NEPTUNE 2 SYS 4 PORT 0702-020-000

## (undated) DEVICE — BALLOON EXTRACTION 15X1950MM 3.2MM TL B-V243Q-A

## (undated) DEVICE — ENDO FUSION OMNI-TOME G31903

## (undated) DEVICE — SPHINCTEROTOME 5.5FRX25MM OMNI-TOME FS-OMNI-35 G31911

## (undated) DEVICE — ENDO TUBING CO2 SMARTCAP STERILE DISP 100145CO2EXT

## (undated) DEVICE — CATH SPYGLASS DS 2 DELIVERY & ACCESS M00546610

## (undated) DEVICE — ENDO CAP AND TUBING STERILE FOR ENDOGATOR  100130

## (undated) DEVICE — ENDO SNARE POLYPECTOMY OVAL 15MM LOOP SD-240U-15

## (undated) DEVICE — GRASPING DEVICE RAPTOR RAT TOOTH 00711177

## (undated) DEVICE — BIOPSY VALVE BIOSHIELD 00711135

## (undated) DEVICE — PROBE LITHOTRIPTOR 1.9MM FOR SPYGLASS 9-195-371DS

## (undated) DEVICE — PACK ENDOSCOPY GI CUSTOM UMMC

## (undated) RX ORDER — SIMETHICONE 40MG/0.6ML
SUSPENSION, DROPS(FINAL DOSAGE FORM)(ML) ORAL
Status: DISPENSED
Start: 2023-02-14

## (undated) RX ORDER — FENTANYL CITRATE 50 UG/ML
INJECTION, SOLUTION INTRAMUSCULAR; INTRAVENOUS
Status: DISPENSED
Start: 2023-01-17

## (undated) RX ORDER — ONDANSETRON 2 MG/ML
INJECTION INTRAMUSCULAR; INTRAVENOUS
Status: DISPENSED
Start: 2023-01-17

## (undated) RX ORDER — EPINEPHRINE 1 MG/ML
INJECTION, SOLUTION INTRAMUSCULAR; SUBCUTANEOUS
Status: DISPENSED
Start: 2023-01-17

## (undated) RX ORDER — ONDANSETRON 2 MG/ML
INJECTION INTRAMUSCULAR; INTRAVENOUS
Status: DISPENSED
Start: 2023-02-14

## (undated) RX ORDER — PROPOFOL 10 MG/ML
INJECTION, EMULSION INTRAVENOUS
Status: DISPENSED
Start: 2023-02-14

## (undated) RX ORDER — INDOMETHACIN 50 MG/1
SUPPOSITORY RECTAL
Status: DISPENSED
Start: 2023-01-17

## (undated) RX ORDER — FENTANYL CITRATE 50 UG/ML
INJECTION, SOLUTION INTRAMUSCULAR; INTRAVENOUS
Status: DISPENSED
Start: 2023-02-14

## (undated) RX ORDER — DEXAMETHASONE SODIUM PHOSPHATE 4 MG/ML
INJECTION, SOLUTION INTRA-ARTICULAR; INTRALESIONAL; INTRAMUSCULAR; INTRAVENOUS; SOFT TISSUE
Status: DISPENSED
Start: 2023-01-17

## (undated) RX ORDER — LABETALOL HYDROCHLORIDE 5 MG/ML
INJECTION, SOLUTION INTRAVENOUS
Status: DISPENSED
Start: 2023-02-14

## (undated) RX ORDER — ESMOLOL HYDROCHLORIDE 10 MG/ML
INJECTION INTRAVENOUS
Status: DISPENSED
Start: 2023-02-14

## (undated) RX ORDER — INDOMETHACIN 50 MG/1
SUPPOSITORY RECTAL
Status: DISPENSED
Start: 2023-02-14

## (undated) RX ORDER — SIMETHICONE 40MG/0.6ML
SUSPENSION, DROPS(FINAL DOSAGE FORM)(ML) ORAL
Status: DISPENSED
Start: 2023-01-17